# Patient Record
Sex: FEMALE | Race: WHITE | NOT HISPANIC OR LATINO | Employment: OTHER | URBAN - NONMETROPOLITAN AREA
[De-identification: names, ages, dates, MRNs, and addresses within clinical notes are randomized per-mention and may not be internally consistent; named-entity substitution may affect disease eponyms.]

---

## 2022-07-12 ENCOUNTER — OFFICE VISIT (OUTPATIENT)
Dept: CARDIOLOGY | Facility: CLINIC | Age: 87
End: 2022-07-12

## 2022-07-12 VITALS
HEIGHT: 64 IN | HEART RATE: 106 BPM | SYSTOLIC BLOOD PRESSURE: 172 MMHG | OXYGEN SATURATION: 92 % | BODY MASS INDEX: 21.34 KG/M2 | WEIGHT: 125 LBS | DIASTOLIC BLOOD PRESSURE: 88 MMHG

## 2022-07-12 DIAGNOSIS — R55 SYNCOPE AND COLLAPSE: ICD-10-CM

## 2022-07-12 DIAGNOSIS — R42 DIZZINESS: Primary | ICD-10-CM

## 2022-07-12 DIAGNOSIS — R00.2 PALPITATIONS: ICD-10-CM

## 2022-07-12 PROCEDURE — 99204 OFFICE O/P NEW MOD 45 MIN: CPT | Performed by: PHYSICIAN ASSISTANT

## 2022-07-12 RX ORDER — MECLIZINE HYDROCHLORIDE 25 MG/1
25 TABLET ORAL 3 TIMES DAILY PRN
COMMUNITY

## 2022-07-12 RX ORDER — ERGOCALCIFEROL 1.25 MG/1
50000 CAPSULE ORAL WEEKLY
COMMUNITY

## 2022-07-12 RX ORDER — TRAZODONE HYDROCHLORIDE 50 MG/1
50 TABLET ORAL AS NEEDED
COMMUNITY

## 2022-07-12 RX ORDER — CLONIDINE HYDROCHLORIDE 0.1 MG/1
TABLET ORAL
Qty: 30 TABLET | Refills: 5 | Status: SHIPPED | OUTPATIENT
Start: 2022-07-12 | End: 2022-10-06 | Stop reason: SDUPTHER

## 2022-07-12 RX ORDER — CITALOPRAM 10 MG/1
10 TABLET ORAL DAILY
COMMUNITY

## 2022-07-12 RX ORDER — VALSARTAN 320 MG/1
320 TABLET ORAL DAILY
Qty: 30 TABLET | Refills: 11 | Status: SHIPPED | OUTPATIENT
Start: 2022-07-12

## 2022-07-12 RX ORDER — AMLODIPINE AND VALSARTAN 5; 160 MG/1; MG/1
1 TABLET ORAL DAILY
COMMUNITY
End: 2022-07-12

## 2022-07-12 RX ORDER — ASPIRIN 81 MG/1
81 TABLET ORAL DAILY
COMMUNITY

## 2022-07-12 RX ORDER — FERROUS SULFATE TAB EC 324 MG (65 MG FE EQUIVALENT) 324 (65 FE) MG
324 TABLET DELAYED RESPONSE ORAL AS NEEDED
COMMUNITY

## 2022-07-12 RX ORDER — LANOLIN ALCOHOL/MO/W.PET/CERES
1000 CREAM (GRAM) TOPICAL DAILY
COMMUNITY

## 2022-07-12 NOTE — PROGRESS NOTES
Subjective   NOEL Rice is a 88 y.o. female     Chief Complaint   Patient presents with   • Hypertension   • Hospital follow up [facility]  The patient presents to clinic today to establish cardiovascular care, referred because of recent ER evaluation for hypertension but severe orthostasis.  The patient was recently evaluated through the local emergency room because of onset of presyncope, even possibly a very brief syncopal episode by patient and family's report.  This occurred with position change.  She was seen through the emergency room where laboratories, CT of the head, and EKG all were largely benign.  By report, chest x-ray suggested a small right perihilar opacity possibly representing atelectasis, however early pneumonia could not be completely excluded.  By her report, she follows with her primary care provider with the same.  Because of ongoing hypertensive values, she was given IV beta-blocker, labetalol, and she eventually was noted to be normotensive.  Orthostatic changes, again by report, was reproduced by ER provider evaluation.  She was encouraged for orthostatic precautions, and discharged for outpatient follow-up.  She presents today in that setting.  Clinically, the patient denies chest pain.  She has stable dyspnea.  She reports intermittent palpitations but no sustained dysrhythmic activity.  She still has recurrent episodes of dizziness, still typically with position change.  Despite this she remains very hypertensive even when checked at home.  She has no further complaints otherwise at this time.      Current Outpatient Medications   Medication Sig Dispense Refill   • aspirin 81 MG EC tablet Take 81 mg by mouth Daily.     • citalopram (CeleXA) 10 MG tablet Take 10 mg by mouth Daily.     • ferrous sulfate 324 (65 Fe) MG tablet delayed-release EC tablet Take 324 mg by mouth Every Other Day.     • meclizine (ANTIVERT) 25 MG tablet Take 25 mg by mouth 3 (Three) Times a Day As Needed for  Dizziness.     • metFORMIN (GLUCOPHAGE) 500 MG tablet Take 500 mg by mouth 2 (Two) Times a Day With Meals.     • traZODone (DESYREL) 50 MG tablet Take 50 mg by mouth Every Night.     • vitamin B-12 (CYANOCOBALAMIN) 1000 MCG tablet Take 1,000 mcg by mouth Daily.     • vitamin D (ERGOCALCIFEROL) 1.25 MG (27181 UT) capsule capsule Take 50,000 Units by mouth 1 (One) Time Per Week.     • cloNIDine (Catapres) 0.1 MG tablet Take TID PRN for BP higher than top #160, bottom # 90 30 tablet 5   • valsartan (DIOVAN) 320 MG tablet Take 1 tablet by mouth Daily. 30 tablet 11     No current facility-administered medications for this visit.       Patient has no known allergies.    Past Medical History:   Diagnosis Date   • Anxiety    • Diabetes mellitus (HCC)    • Hypertension    • Vitamin D deficiency        Social History     Socioeconomic History   • Marital status:    Tobacco Use   • Smoking status: Former Smoker     Years: 5.00   Substance and Sexual Activity   • Alcohol use: Not Currently   • Drug use: Never   • Sexual activity: Defer       Family History   Problem Relation Age of Onset   • Diabetes Mother    • Heart attack Father    • Heart attack Sister    • Lung cancer Sister        Review of Systems   Constitutional: Negative.  Negative for chills and fever.   HENT: Negative.  Negative for congestion and sinus pressure.    Respiratory: Positive for shortness of breath. Negative for chest tightness.    Cardiovascular: Positive for palpitations (races at times). Negative for chest pain and leg swelling.   Gastrointestinal: Negative.  Negative for abdominal pain, blood in stool, constipation, diarrhea, nausea and vomiting.   Genitourinary: Negative.  Negative for dysuria, frequency, hematuria and urgency.   Neurological: Positive for dizziness (taking meclizine prn). Negative for syncope and light-headedness.   Hematological: Bruises/bleeds easily.   Psychiatric/Behavioral: Negative.  Negative for sleep disturbance  "(denies waking with soa or cp).       Objective     Vitals:    07/12/22 1458   BP: 172/88   BP Location: Left arm   Patient Position: Sitting   Pulse: 106   SpO2: 92%   Weight: 56.7 kg (125 lb)   Height: 162.6 cm (64\")        /88 (BP Location: Left arm, Patient Position: Sitting)   Pulse 106   Ht 162.6 cm (64\")   Wt 56.7 kg (125 lb)   SpO2 92%   BMI 21.46 kg/m²      Lab Results (most recent)     None          Physical Exam  Vitals and nursing note reviewed.   Constitutional:       General: She is not in acute distress.     Appearance: She is well-developed.   HENT:      Head: Normocephalic and atraumatic.   Eyes:      Conjunctiva/sclera: Conjunctivae normal.      Pupils: Pupils are equal, round, and reactive to light.   Neck:      Vascular: No JVD.      Trachea: No tracheal deviation.   Cardiovascular:      Rate and Rhythm: Normal rate and regular rhythm.      Heart sounds: Normal heart sounds.      Comments: 1/6 systolic ejection murmur noted.  Pulmonary:      Effort: Pulmonary effort is normal.      Breath sounds: Normal breath sounds.   Abdominal:      General: Bowel sounds are normal. There is no distension.      Palpations: Abdomen is soft. There is no mass.      Tenderness: There is no abdominal tenderness. There is no guarding or rebound.   Musculoskeletal:         General: No tenderness or deformity. Normal range of motion.      Cervical back: Normal range of motion and neck supple.   Skin:     General: Skin is warm and dry.      Coloration: Skin is not pale.      Findings: No erythema or rash.   Neurological:      Mental Status: She is alert and oriented to person, place, and time.   Psychiatric:         Behavior: Behavior normal.         Thought Content: Thought content normal.         Judgment: Judgment normal.         Procedure   Procedures         Assessment & Plan      Diagnosis Plan   1. Dizziness  Basic Metabolic Panel    Adult Transthoracic Echo Complete W/ Cont if Necessary Per Protocol "    Holter Monitor - 24 Hour   2. Syncope and collapse  Basic Metabolic Panel    Adult Transthoracic Echo Complete W/ Cont if Necessary Per Protocol    Holter Monitor - 24 Hour   3. Palpitations  Basic Metabolic Panel    Adult Transthoracic Echo Complete W/ Cont if Necessary Per Protocol    Holter Monitor - 24 Hour     1.  The patient has recurrent episodes of dizziness, mostly consistent with orthostasis.  She had however a presyncopal episode with a possibly very brief episode of syncope prior to ER evaluation.  Laboratories, EKG, and telemetry were largely unremarkable.  By report, orthostasis was reproduced with blood pressure checks at that time.  She was given IV labetalol and blood pressures eventually normalized and she was discharged in stable condition for outpatient follow-up.    2.  In that setting, cardiac consultation has been requested.  I would discontinue amlodipine because of her degree of orthostasis.  As she has ongoing hypertension, I would increase valsartan to 320 mg 1 tab daily.    3.  Given increase in the same, we will schedule BMP in 2 weeks to evaluate renal function and potassium.    4.  We did give her clonidine 0.1 mg as needed blood pressures greater than 160/90.  We will await response to adjustment in valsartan and can adjust antihypertensives further in the near future if needed.  She has clonidine to take if further issues are noted with hypertensive excursions.    5.  I would schedule for a Holter monitor to evaluate for rate or rhythm disturbance issues potentially contributing to clinical scenario as above.    6.  We will also schedule for an echocardiogram to evaluate her structurally, again given symptoms.    7.  She will monitor blood pressures and clinical course otherwise and call to us immediately for any issues.  We can recommend her further once we know results of studies and response to change in medications.           NOEL Rice  reports that she has quit smoking.  She quit after 5.00 years of use. She does not have any smokeless tobacco history on file..     Patient brought in medicine list to appointment, it's been reviewed with patient and med list was updated in the chart.     Advance Care Planning   ACP discussion was held with the patient during this visit. Patient has an advance directive (not in EMR), copy requested.           BMI is within normal parameters. No other follow-up for BMI required.           Electronically signed by:

## 2022-07-13 ENCOUNTER — LAB (OUTPATIENT)
Dept: LAB | Facility: HOSPITAL | Age: 87
End: 2022-07-13

## 2022-07-13 DIAGNOSIS — R00.2 PALPITATIONS: ICD-10-CM

## 2022-07-13 DIAGNOSIS — R42 DIZZINESS: ICD-10-CM

## 2022-07-13 DIAGNOSIS — R55 SYNCOPE AND COLLAPSE: ICD-10-CM

## 2022-07-13 LAB
ANION GAP SERPL CALCULATED.3IONS-SCNC: 16.6 MMOL/L (ref 5–15)
BUN SERPL-MCNC: 20 MG/DL (ref 8–23)
BUN/CREAT SERPL: 40 (ref 7–25)
CALCIUM SPEC-SCNC: 10.3 MG/DL (ref 8.6–10.5)
CHLORIDE SERPL-SCNC: 97 MMOL/L (ref 98–107)
CO2 SERPL-SCNC: 22.4 MMOL/L (ref 22–29)
CREAT SERPL-MCNC: 0.5 MG/DL (ref 0.57–1)
EGFRCR SERPLBLD CKD-EPI 2021: 90.3 ML/MIN/1.73
GLUCOSE SERPL-MCNC: 111 MG/DL (ref 65–99)
POTASSIUM SERPL-SCNC: 4.9 MMOL/L (ref 3.5–5.2)
SODIUM SERPL-SCNC: 136 MMOL/L (ref 136–145)

## 2022-07-13 PROCEDURE — 80048 BASIC METABOLIC PNL TOTAL CA: CPT

## 2022-07-13 PROCEDURE — 36415 COLL VENOUS BLD VENIPUNCTURE: CPT

## 2022-08-15 ENCOUNTER — HOSPITAL ENCOUNTER (OUTPATIENT)
Dept: CARDIOLOGY | Facility: HOSPITAL | Age: 87
Discharge: HOME | End: 2022-08-15
Admitting: PHYSICIAN ASSISTANT

## 2022-08-15 DIAGNOSIS — R55 SYNCOPE AND COLLAPSE: ICD-10-CM

## 2022-08-15 DIAGNOSIS — R00.2 PALPITATIONS: ICD-10-CM

## 2022-08-15 DIAGNOSIS — R42 DIZZINESS: ICD-10-CM

## 2022-08-15 PROCEDURE — 93306 TTE W/DOPPLER COMPLETE: CPT | Performed by: INTERNAL MEDICINE

## 2022-08-15 PROCEDURE — 93306 TTE W/DOPPLER COMPLETE: CPT

## 2022-08-24 LAB
BH CV ECHO MEAS - ACS: 1.77 CM
BH CV ECHO MEAS - AO MAX PG: 7.5 MMHG
BH CV ECHO MEAS - AO MEAN PG: 3.1 MMHG
BH CV ECHO MEAS - AO ROOT DIAM: 3.2 CM
BH CV ECHO MEAS - AO V2 MAX: 135.6 CM/SEC
BH CV ECHO MEAS - AO V2 VTI: 27.4 CM
BH CV ECHO MEAS - AVA(I,D): 2.04 CM2
BH CV ECHO MEAS - EDV(CUBED): 31.6 ML
BH CV ECHO MEAS - EDV(MOD-SP4): 44.8 ML
BH CV ECHO MEAS - EF(MOD-BP): 68 %
BH CV ECHO MEAS - EF(MOD-SP4): 65.4 %
BH CV ECHO MEAS - ESV(CUBED): 15.8 ML
BH CV ECHO MEAS - ESV(MOD-SP4): 15.5 ML
BH CV ECHO MEAS - FS: 20.6 %
BH CV ECHO MEAS - IVS/LVPW: 1.1 CM
BH CV ECHO MEAS - IVSD: 1.37 CM
BH CV ECHO MEAS - LA DIMENSION: 3.5 CM
BH CV ECHO MEAS - LAT PEAK E' VEL: 6.4 CM/SEC
BH CV ECHO MEAS - LV DIASTOLIC VOL/BSA (35-75): 28 CM2
BH CV ECHO MEAS - LV MASS(C)D: 135.1 GRAMS
BH CV ECHO MEAS - LV MAX PG: 3 MMHG
BH CV ECHO MEAS - LV MEAN PG: 2.01 MMHG
BH CV ECHO MEAS - LV SYSTOLIC VOL/BSA (12-30): 9.7 CM2
BH CV ECHO MEAS - LV V1 MAX: 75.4 CM/SEC
BH CV ECHO MEAS - LV V1 VTI: 19.7 CM
BH CV ECHO MEAS - LVIDD: 3.2 CM
BH CV ECHO MEAS - LVIDS: 2.5 CM
BH CV ECHO MEAS - LVOT AREA: 2.8 CM2
BH CV ECHO MEAS - LVOT DIAM: 1.9 CM
BH CV ECHO MEAS - LVPWD: 1.25 CM
BH CV ECHO MEAS - MED PEAK E' VEL: 4.7 CM/SEC
BH CV ECHO MEAS - MR MAX PG: 160.2 MMHG
BH CV ECHO MEAS - MR MAX VEL: 632.2 CM/SEC
BH CV ECHO MEAS - MR MEAN PG: 85.3 MMHG
BH CV ECHO MEAS - MR MEAN VEL: 412.3 CM/SEC
BH CV ECHO MEAS - MR VTI: 210.1 CM
BH CV ECHO MEAS - MV A MAX VEL: 146.3 CM/SEC
BH CV ECHO MEAS - MV DEC SLOPE: 399 CM/SEC2
BH CV ECHO MEAS - MV E MAX VEL: 84.5 CM/SEC
BH CV ECHO MEAS - MV E/A: 0.58
BH CV ECHO MEAS - MV MAX PG: 7.7 MMHG
BH CV ECHO MEAS - MV MEAN PG: 3.4 MMHG
BH CV ECHO MEAS - MV P1/2T: 60.8 MSEC
BH CV ECHO MEAS - MV V2 VTI: 27.9 CM
BH CV ECHO MEAS - MVA(P1/2T): 3.6 CM2
BH CV ECHO MEAS - MVA(VTI): 2 CM2
BH CV ECHO MEAS - PA V2 MAX: 83.1 CM/SEC
BH CV ECHO MEAS - RAP SYSTOLE: 10 MMHG
BH CV ECHO MEAS - RV MAX PG: 1.95 MMHG
BH CV ECHO MEAS - RV V1 MAX: 69.8 CM/SEC
BH CV ECHO MEAS - RV V1 VTI: 11.4 CM
BH CV ECHO MEAS - RVDD: 2.26 CM
BH CV ECHO MEAS - RVSP: 67.4 MMHG
BH CV ECHO MEAS - SI(MOD-SP4): 18.3 ML/M2
BH CV ECHO MEAS - SV(LVOT): 55.7 ML
BH CV ECHO MEAS - SV(MOD-SP4): 29.3 ML
BH CV ECHO MEAS - TAPSE (>1.6): 1.48 CM
BH CV ECHO MEAS - TR MAX PG: 57.4 MMHG
BH CV ECHO MEAS - TR MAX VEL: 378.7 CM/SEC
BH CV ECHO MEASUREMENTS AVERAGE E/E' RATIO: 15.23
IVRT: 119 MSEC
LEFT ATRIUM VOLUME INDEX: 23.4 ML/M2
MAXIMAL PREDICTED HEART RATE: 132 BPM
STRESS TARGET HR: 112 BPM

## 2022-08-31 ENCOUNTER — TELEPHONE (OUTPATIENT)
Dept: CARDIOLOGY | Facility: CLINIC | Age: 87
End: 2022-08-31

## 2022-08-31 DIAGNOSIS — I27.20 PULMONARY HYPERTENSION: Primary | ICD-10-CM

## 2022-08-31 DIAGNOSIS — R00.2 PALPITATIONS: ICD-10-CM

## 2022-08-31 DIAGNOSIS — R06.02 SHORTNESS OF BREATH: ICD-10-CM

## 2022-08-31 DIAGNOSIS — R68.89 HYPOKINESIS: ICD-10-CM

## 2022-09-07 ENCOUNTER — TELEPHONE (OUTPATIENT)
Dept: CARDIOLOGY | Facility: CLINIC | Age: 87
End: 2022-09-07

## 2022-09-07 NOTE — TELEPHONE ENCOUNTER
"Pt's daughter called stating that pt received a call to \"change her stress test.\" I tried to reach Dora, but she was unavailable. I called Tere, she stated that everything looks fine on their end and that stress is set up for 9/14/22.    I informed pt's daughter of the above and apologized for any confusion.   "

## 2022-09-14 ENCOUNTER — HOSPITAL ENCOUNTER (OUTPATIENT)
Dept: CARDIOLOGY | Facility: HOSPITAL | Age: 87
Discharge: HOME | End: 2022-09-14

## 2022-09-14 DIAGNOSIS — R06.02 SHORTNESS OF BREATH: ICD-10-CM

## 2022-09-14 DIAGNOSIS — I27.20 PULMONARY HYPERTENSION: ICD-10-CM

## 2022-09-14 DIAGNOSIS — R68.89 HYPOKINESIS: ICD-10-CM

## 2022-09-14 DIAGNOSIS — R00.2 PALPITATIONS: ICD-10-CM

## 2022-09-14 PROCEDURE — 78452 HT MUSCLE IMAGE SPECT MULT: CPT | Performed by: INTERNAL MEDICINE

## 2022-09-14 PROCEDURE — 0 TECHNETIUM SESTAMIBI: Performed by: INTERNAL MEDICINE

## 2022-09-14 PROCEDURE — 78452 HT MUSCLE IMAGE SPECT MULT: CPT

## 2022-09-14 PROCEDURE — 25010000002 REGADENOSON 0.4 MG/5ML SOLUTION: Performed by: INTERNAL MEDICINE

## 2022-09-14 PROCEDURE — A9500 TC99M SESTAMIBI: HCPCS | Performed by: INTERNAL MEDICINE

## 2022-09-14 PROCEDURE — 93017 CV STRESS TEST TRACING ONLY: CPT

## 2022-09-14 PROCEDURE — 93018 CV STRESS TEST I&R ONLY: CPT | Performed by: INTERNAL MEDICINE

## 2022-09-14 RX ADMIN — TECHNETIUM TC 99M SESTAMIBI 1 DOSE: 1 INJECTION INTRAVENOUS at 13:51

## 2022-09-14 RX ADMIN — REGADENOSON 0.4 MG: 0.08 INJECTION, SOLUTION INTRAVENOUS at 13:51

## 2022-09-14 RX ADMIN — TECHNETIUM TC 99M SESTAMIBI 1 DOSE: 1 INJECTION INTRAVENOUS at 12:19

## 2022-09-21 LAB
BH CV REST NUCLEAR ISOTOPE DOSE: 10 MCI
BH CV STRESS COMMENTS STAGE 1: NORMAL
BH CV STRESS DOSE REGADENOSON STAGE 1: 0.4
BH CV STRESS DURATION MIN STAGE 1: 0
BH CV STRESS DURATION SEC STAGE 1: 10
BH CV STRESS NUCLEAR ISOTOPE DOSE: 30 MCI
BH CV STRESS PROTOCOL 1: NORMAL
BH CV STRESS RECOVERY BP: NORMAL MMHG
BH CV STRESS RECOVERY HR: 104 BPM
BH CV STRESS STAGE 1: 1
MAXIMAL PREDICTED HEART RATE: 132 BPM
PERCENT MAX PREDICTED HR: 81.06 %
STRESS BASELINE BP: NORMAL MMHG
STRESS BASELINE HR: 90 BPM
STRESS PERCENT HR: 95 %
STRESS POST PEAK BP: NORMAL MMHG
STRESS POST PEAK HR: 107 BPM
STRESS TARGET HR: 112 BPM

## 2022-09-26 ENCOUNTER — TELEPHONE (OUTPATIENT)
Dept: CARDIOLOGY | Facility: CLINIC | Age: 87
End: 2022-09-26

## 2022-09-26 NOTE — TELEPHONE ENCOUNTER
Follow up scheduled 10/3/22. Patient notified by front office. .  Tere Dumont MA      ----- Message from ABIMAEL Guillermo sent at 9/23/2022  1:05 PM EDT -----    ----- Message -----  From: Chidi Perdue PA  Sent: 9/23/2022   9:01 AM EDT  To: Marianna Ritter MA    Follow-up 3 to 4 weeks.

## 2022-10-03 ENCOUNTER — OFFICE VISIT (OUTPATIENT)
Dept: CARDIOLOGY | Facility: CLINIC | Age: 87
End: 2022-10-03

## 2022-10-03 VITALS
WEIGHT: 125.2 LBS | BODY MASS INDEX: 21.37 KG/M2 | OXYGEN SATURATION: 97 % | SYSTOLIC BLOOD PRESSURE: 153 MMHG | DIASTOLIC BLOOD PRESSURE: 79 MMHG | HEART RATE: 107 BPM | HEIGHT: 64 IN

## 2022-10-03 DIAGNOSIS — R06.02 SHORTNESS OF BREATH: ICD-10-CM

## 2022-10-03 DIAGNOSIS — R42 DIZZINESS: ICD-10-CM

## 2022-10-03 DIAGNOSIS — R00.2 PALPITATIONS: ICD-10-CM

## 2022-10-03 DIAGNOSIS — I27.20 PULMONARY HYPERTENSION: Primary | ICD-10-CM

## 2022-10-03 PROCEDURE — 99214 OFFICE O/P EST MOD 30 MIN: CPT | Performed by: PHYSICIAN ASSISTANT

## 2022-10-03 NOTE — PROGRESS NOTES
Problem list     Subjective   NOEL Rice is a 88 y.o. female     Chief Complaint   Patient presents with   • Shortness of Breath     Abn stress, echo and monitor f/u   • Dizziness   • Hypertension       HPI  The patient presents into the clinic today to review test findings.  She was scheduled for testing because of recurrent dizziness with even a syncopal episode, dyspnea, palpitations, and issues otherwise.  She initially was scheduled for an echo and a Holter monitor.  Echo supported low normal EF at 50 to 55% with possible inferior wall hypokinesis.  This was felt to be a new finding for the patient.  She did have moderate mitral regurgitation, but no significant abnormalities otherwise in that regard.  She also had what was felt to be moderate to severe pulmonary hypertension with PA systolic pressures in the high 60s.  Holter monitor indicated sinus rhythm throughout with PACs and PVCs which were occasional, short-lived runs of SVT, but no significant dysrhythmic activity.  She had nothing certainly to describe syncope as above.  Because of low normal EF with inferior hypokinesis, we did recommend stress testing.  She had that and presents today to discuss all these findings.  Stress test supported inferoseptal, inferior, and inferolateral wall MI with no significant amy-infarct ischemia.  Post stress EF was at 50%.    The patient continues to have dizziness.  This was previously felt to be compatible with orthostasis.  The patient otherwise reports only rare chest tightness, but ongoing chest pain otherwise.  She has ongoing dyspnea which is limiting.  She still has dizziness as above.  She has had no further presyncope and certainly no syncopal episodes.  She denies failure symptoms.  Blood pressures fluctuate but are high normal mostly when checked.  She would like to follow that closely at home before we adjust antihypertensive therapies.  Patient has no further complaints otherwise at this  time.    Current Outpatient Medications on File Prior to Visit   Medication Sig Dispense Refill   • aspirin 81 MG EC tablet Take 81 mg by mouth Daily.     • citalopram (CeleXA) 10 MG tablet Take 10 mg by mouth Daily.     • cloNIDine (Catapres) 0.1 MG tablet Take TID PRN for BP higher than top #160, bottom # 90 30 tablet 5   • ferrous sulfate 324 (65 Fe) MG tablet delayed-release EC tablet Take 324 mg by mouth As Needed.     • meclizine (ANTIVERT) 25 MG tablet Take 25 mg by mouth 3 (Three) Times a Day As Needed for Dizziness.     • metFORMIN (GLUCOPHAGE) 1000 MG tablet Take 1,000 mg by mouth 2 (Two) Times a Day With Meals.     • traZODone (DESYREL) 50 MG tablet Take 50 mg by mouth As Needed.     • valsartan (DIOVAN) 320 MG tablet Take 1 tablet by mouth Daily. 30 tablet 11   • vitamin B-12 (CYANOCOBALAMIN) 1000 MCG tablet Take 1,000 mcg by mouth Daily.     • vitamin D (ERGOCALCIFEROL) 1.25 MG (30846 UT) capsule capsule Take 50,000 Units by mouth 1 (One) Time Per Week.       No current facility-administered medications on file prior to visit.       Patient has no known allergies.    Past Medical History:   Diagnosis Date   • Anxiety    • Diabetes mellitus (HCC)    • Hypertension    • Vitamin D deficiency        Social History     Socioeconomic History   • Marital status:    Tobacco Use   • Smoking status: Former Smoker     Years: 5.00   • Smokeless tobacco: Never Used   Substance and Sexual Activity   • Alcohol use: Not Currently   • Drug use: Never   • Sexual activity: Defer       Family History   Problem Relation Age of Onset   • Diabetes Mother    • Heart attack Father    • Heart attack Sister    • Lung cancer Sister        Review of Systems   Constitutional: Positive for fatigue. Negative for chills, diaphoresis and fever.   HENT: Positive for tinnitus.    Eyes: Negative.    Respiratory: Positive for cough and shortness of breath. Negative for apnea, chest tightness and wheezing.    Cardiovascular: Positive  "for palpitations (racing). Negative for chest pain and leg swelling.   Gastrointestinal: Negative.  Negative for abdominal pain, blood in stool, constipation, diarrhea, nausea and vomiting.   Endocrine: Negative.    Genitourinary: Negative.  Negative for hematuria.   Musculoskeletal: Positive for arthralgias. Negative for back pain, myalgias and neck pain.   Skin: Negative.    Allergic/Immunologic: Negative.    Neurological: Positive for dizziness (with movement), weakness and headaches. Negative for syncope, light-headedness and numbness.   Hematological: Bruises/bleeds easily.   Psychiatric/Behavioral: Negative for sleep disturbance.       Objective   Vitals:    10/03/22 1310   BP: 153/79   BP Location: Left arm   Patient Position: Sitting   Pulse: 107   SpO2: 97%   Weight: 56.8 kg (125 lb 3.2 oz)   Height: 162.6 cm (64.02\")      /79 (BP Location: Left arm, Patient Position: Sitting)   Pulse 107   Ht 162.6 cm (64.02\")   Wt 56.8 kg (125 lb 3.2 oz)   SpO2 97%   BMI 21.48 kg/m²    Lab Results (most recent)     None        Physical Exam  Vitals and nursing note reviewed.   Constitutional:       General: She is not in acute distress.     Appearance: She is well-developed.   HENT:      Head: Normocephalic and atraumatic.   Eyes:      Conjunctiva/sclera: Conjunctivae normal.      Pupils: Pupils are equal, round, and reactive to light.   Neck:      Vascular: No JVD.      Trachea: No tracheal deviation.   Cardiovascular:      Rate and Rhythm: Normal rate and regular rhythm.      Heart sounds: Murmur (LSB) heard.    Systolic murmur is present with a grade of 1/6.  Pulmonary:      Effort: Pulmonary effort is normal.      Breath sounds: Normal breath sounds.   Abdominal:      General: Bowel sounds are normal. There is no distension.      Palpations: Abdomen is soft. There is no mass.      Tenderness: There is no abdominal tenderness. There is no guarding or rebound.   Musculoskeletal:         General: No tenderness " or deformity. Normal range of motion.      Cervical back: Normal range of motion and neck supple.   Skin:     General: Skin is warm and dry.      Coloration: Skin is not pale.      Findings: No erythema or rash.   Neurological:      Mental Status: She is alert and oriented to person, place, and time.   Psychiatric:         Behavior: Behavior normal.         Thought Content: Thought content normal.         Judgment: Judgment normal.           Procedure   Procedures       Assessment & Plan      Diagnosis Plan   1. Pulmonary hypertension (HCC)     2. Shortness of breath     3. Palpitations     4. Dizziness       1.  I am concerned with the patient's stress and echo findings.  Stress test supports a basically inferior and inferolateral wall MI, which is new to the patient by her report.  She has been told of no MI in the past.  Echo supports still inferior wall MI with low normal systolic function.  With clinical symptoms and scenario otherwise, particularly when coupled with stress and echo findings, I have recommended catheterization.  She wants to think about this and discuss this with family members.  She would call back if she decides to have that done.    2.  Because of pulmonary hypertension, with PA systolic pressures in the mid to high 60s, I did recommend pulmonary evaluation.  I have recommended that the patient have ongoing evaluation for the same.  She will call if she has any complications with that appointment.    3.  For now, I would continue medical regimen.  She does feel that she is high normal to even hypertensive a lot when checked at medical offices.  She will start monitoring that closely at home.  I would hold from further antihypertensive therapy for now as she has had severe orthostasis in the past.  She still has recurrent orthostatic changes at home, which is fairly symptomatic.  She feels that this has not been intensified by the increase in valsartan therapy.  She will monitor that we can  adjust if able.    4.  Further pending the patient's decision.  If catheterization was considered, I would consider right heart catheterization at the same time.    5.  We will see her routinely through the clinic and when she calls back with her decision.           NOEL Rice  reports that she has quit smoking. She quit after 5.00 years of use. She has never used smokeless tobacco..     Advance Care Planning   ACP discussion was held with the patient during this visit. Patient has an advance directive (not in EMR), copy requested.         Electronically signed by:

## 2022-10-06 ENCOUNTER — OFFICE VISIT (OUTPATIENT)
Dept: PULMONOLOGY | Facility: CLINIC | Age: 87
End: 2022-10-06

## 2022-10-06 VITALS
WEIGHT: 126 LBS | DIASTOLIC BLOOD PRESSURE: 96 MMHG | BODY MASS INDEX: 21.51 KG/M2 | HEART RATE: 102 BPM | OXYGEN SATURATION: 95 % | HEIGHT: 64 IN | SYSTOLIC BLOOD PRESSURE: 171 MMHG

## 2022-10-06 DIAGNOSIS — R06.02 SHORTNESS OF BREATH: Primary | ICD-10-CM

## 2022-10-06 DIAGNOSIS — R09.82 POST-NASAL DRIP: ICD-10-CM

## 2022-10-06 DIAGNOSIS — I27.20 PULMONARY HYPERTENSION: ICD-10-CM

## 2022-10-06 DIAGNOSIS — R93.89 ABNORMAL CXR: ICD-10-CM

## 2022-10-06 DIAGNOSIS — K21.00 GASTROESOPHAGEAL REFLUX DISEASE WITH ESOPHAGITIS, UNSPECIFIED WHETHER HEMORRHAGE: ICD-10-CM

## 2022-10-06 DIAGNOSIS — J43.9 PULMONARY EMPHYSEMA, UNSPECIFIED EMPHYSEMA TYPE: ICD-10-CM

## 2022-10-06 PROCEDURE — 99204 OFFICE O/P NEW MOD 45 MIN: CPT | Performed by: NURSE PRACTITIONER

## 2022-10-06 RX ORDER — CLONIDINE HYDROCHLORIDE 0.1 MG/1
0.1 TABLET ORAL 2 TIMES DAILY
Qty: 60 TABLET | Refills: 11 | Status: SHIPPED | OUTPATIENT
Start: 2022-10-06

## 2022-10-06 RX ORDER — ALBUTEROL SULFATE 90 UG/1
2 AEROSOL, METERED RESPIRATORY (INHALATION) EVERY 4 HOURS PRN
Qty: 6.7 G | Refills: 1 | Status: SHIPPED | OUTPATIENT
Start: 2022-10-06 | End: 2022-12-28

## 2022-10-06 RX ORDER — MOMETASONE FUROATE 50 UG/1
2 SPRAY, METERED NASAL DAILY
Qty: 17 G | Refills: 5 | Status: SHIPPED | OUTPATIENT
Start: 2022-10-06

## 2022-10-06 NOTE — PROGRESS NOTES
"     New Pulmonary Patient Office Visit      Patient Name: NOEL Rice    Referring Physician: Chidi Perdue PA    Chief Complaint:    Chief Complaint   Patient presents with   • Consult   • Shortness of Breath       History of Present Illness: NOEL Rice is a 88 y.o. female who is here today to establish care with Pulmonary for shortness of breath.    The patient recently saw NATE Trujillo on Monday, 10/03/2022, who was concerned about her elevated pulmonary artery pressures revealed on her echocardiogram. She denies any recent angina and has not been previously diagnosed with any underlying lung disease including COPD, asthma, or emphysema.     The patient states she has been experiencing moderate dyspnea with minimal exertion. She adds that she becomes fatigued with activities of daily living including showering and getting dressed. She notes she has been experiencing these symptoms over the past year. The patient reports constant coughing, but she denies any wheezing or ankle swelling. She also denies any history of frequent exposure to secondhand smoke or working in an environment in the past where she was exposed to airborne pollutants. The patient reports obtaining a chest x-ray in 06/2022, at [facility] when her blood pressure was elevated which suggested underlying emphysema and a right-sided opacity.     The patient states her blood pressure was not uncontrolled until recently. She reports her blood pressure medication was adjusted, but the adult female with her notes the patient was seen at the emergency room in 06/2022, previous to the medication change, secondary to concern regarding her blood pressure being elevated at approximately \"235/1-something\" mmHg. However, the adult female adds that the patient's blood pressure did decrease that night. The patient is taking clonidine and valsartan 320 mg. She states she has been on valsartan for approximately 1.5 months or longer. She is not " "open to considering any cardiac catheterization at this time.      Additionally, the patient adds that she has noticed gastric reflux over the past 2 to 3 nights secondary to foods she has eaten, and she takes Tums for this.     Regarding her sleep, the patient states she likes to stay up late at night and sleep in late in the mornings, but intermittently she does not like to sleep at all. The adult female does not believe the patient snores. The patient denies breathing difficulty at night. She notes feeling \"groggy\" upon waking in the morning and she sits for 5 to 10 minutes prior to getting up secondary to dizziness. She declines home sleep study testing. The adult female believes meclizine increases the patient's fatigue. The patient is not overweight.     She confirms having seasonal allergies. She reports taking Claritin in the past without relief as well as Flonase nasal spray which caused her dizziness, according to the adult female.     The patient confirms a history of diabetes mellitus type 2 and she is only taking metformin currently.     She reports falling 3 weeks ago, which resulted in the development of ecchymosis on her knee.     The patient reports a past history of smoking for approximately 5 years, but denies a history of asthma.     Subjective      Review of Systems:   Review of Systems   Constitutional: Negative for fever and unexpected weight change.   HENT: Positive for postnasal drip.    Respiratory: Positive for cough and shortness of breath. Negative for wheezing.    Cardiovascular: Negative for chest pain and leg swelling.   Allergic/Immunologic: Positive for environmental allergies.      Past Medical History:   Past Medical History:   Diagnosis Date   • Anxiety    • Diabetes mellitus (HCC)    • Hypertension    • Vitamin D deficiency        Past Surgical History:   Past Surgical History:   Procedure Laterality Date   • CHOLECYSTECTOMY     • HYSTERECTOMY         Family History:   Family " "History   Problem Relation Age of Onset   • Diabetes Mother    • Heart attack Father    • Heart attack Sister    • Lung cancer Sister        Social History:   Social History     Socioeconomic History   • Marital status:    Tobacco Use   • Smoking status: Former     Years: 5.00     Types: Cigarettes   • Smokeless tobacco: Never   Substance and Sexual Activity   • Alcohol use: Not Currently   • Drug use: Never   • Sexual activity: Defer       Medications:     Current Outpatient Medications:   •  aspirin 81 MG EC tablet, Take 81 mg by mouth Daily., Disp: , Rfl:   •  citalopram (CeleXA) 10 MG tablet, Take 10 mg by mouth Daily., Disp: , Rfl:   •  cloNIDine (Catapres) 0.1 MG tablet, Take TID PRN for BP higher than top #160, bottom # 90, Disp: 30 tablet, Rfl: 5  •  ferrous sulfate 324 (65 Fe) MG tablet delayed-release EC tablet, Take 324 mg by mouth As Needed., Disp: , Rfl:   •  meclizine (ANTIVERT) 25 MG tablet, Take 25 mg by mouth 3 (Three) Times a Day As Needed for Dizziness., Disp: , Rfl:   •  metFORMIN (GLUCOPHAGE) 1000 MG tablet, Take 1,000 mg by mouth 2 (Two) Times a Day With Meals., Disp: , Rfl:   •  traZODone (DESYREL) 50 MG tablet, Take 50 mg by mouth As Needed., Disp: , Rfl:   •  valsartan (DIOVAN) 320 MG tablet, Take 1 tablet by mouth Daily., Disp: 30 tablet, Rfl: 11  •  vitamin B-12 (CYANOCOBALAMIN) 1000 MCG tablet, Take 1,000 mcg by mouth Daily., Disp: , Rfl:   •  vitamin D (ERGOCALCIFEROL) 1.25 MG (40096 UT) capsule capsule, Take 50,000 Units by mouth 1 (One) Time Per Week., Disp: , Rfl:     Allergies:   No Known Allergies    Objective     Physical Exam:  Vital Signs:   Vitals:    10/06/22 1418 10/06/22 1518   BP: (!) 192/106 171/96   Pulse: 102    SpO2: 95%    Weight: 57.2 kg (126 lb)    Height: 162.6 cm (64\")      Body mass index is 21.63 kg/m².    Physical Exam  Constitutional:       General: She is not in acute distress.     Appearance: She is not toxic-appearing.   HENT:      Head: Normocephalic " and atraumatic.   Eyes:      Extraocular Movements: Extraocular movements intact.   Cardiovascular:      Rate and Rhythm: Regular rhythm.      Heart sounds: Normal heart sounds.      Comments: No ankle swelling noted, bilaterally.  Pulmonary:      Effort: Pulmonary effort is normal.      Breath sounds: Normal breath sounds.      Comments: Lungs clear to auscultation.  Abdominal:      General: There is no distension.   Musculoskeletal:      Cervical back: Neck supple.      Comments: Spinal kyphosis noted.   Skin:     General: Skin is warm and dry.      Findings: No rash.   Neurological:      General: No focal deficit present.      Mental Status: She is alert and oriented to person, place, and time.   Psychiatric:         Mood and Affect: Mood normal.         Behavior: Behavior normal.       Results Review:   Results for orders placed during the hospital encounter of 08/15/22    Adult Transthoracic Echo Complete W/ Cont if Necessary Per Protocol    Interpretation Summary  Borderline technically adequate study.    1.  LV size and global LV systolic function are preserved.  Visually estimated ejection fraction is 50 to 55%.  There is mild concentric left ventricular hypertrophy with grade 1A diastolic dysfunction.  Inferior wall hypokinesis is suggested in several views.  Moderate left atrial enlargement.  Right heart chambers are normal.  No septal defect or intracavitary mass or thrombus.    2.  There is minimal mitral annular calcification as well as thickening of both leaflets of mild sclerosis of the posterior mitral leaflet.  There is no mitral stenosis and there is moderate mitral regurgitation.  The aortic valve is grossly normally configured with no aortic stenosis and with trivial AI.  There is moderate tricuspid regurgitation from a morphologically normal valve.    3.  No pericardial or great vessel pathology.    4.  Moderate to severe pulmonary hypertension with PA systolic pressures estimated in the high  60s.    June 2022 chest x-ray showed hyperinflated lungs compatible with emphysema.  Small right perihilar opacity noted.    Assessment / Plan      Assessment/Plan:    Diagnoses and all orders for this visit:    1. Shortness of breath (Primary)  -     Pulmonary Function Test; Future  -     albuterol sulfate  (90 Base) MCG/ACT inhaler; Inhale 2 puffs Every 4 (Four) Hours As Needed for Wheezing or Shortness of Air.  Dispense: 6.7 g; Refill: 1  Likely multifactorial. May be an anginal equivalent given recent stress test results though patient declines to undergo LHC. Further assess with full PFTs.    2. Pulmonary emphysema, unspecified emphysema type (HCC)  -     Alpha - 1 - Antitrypsin Deficiency; Future  Suggested on recent chest imaging.  Obtain PFTs and alpha-1 testing.  Will discuss use of a maintenance inhaler regimen at next visit pending PFT review.    3. Pulmonary hypertension (HCC)  Likely multifactorial.  Patient declines to consider right heart catheterization.  Also declines to consider a sleep study at this time.  Recommend optimally treating any underlying conditions.  Patient to continue working with cardiology on blood pressure control.    4. Abnormal CXR  -     CT Chest Without Contrast Diagnostic; Future  Obtain chest CT scan to evaluate opacity noted on recent chest x-ray.    5. Post-nasal drip  -     mometasone (NASONEX) 50 MCG/ACT nasal spray; 2 sprays into the nostril(s) as directed by provider Daily.  Dispense: 17 g; Refill: 5  Likely contributes to cough.    6. Gastroesophageal reflux disease with esophagitis, unspecified whether hemorrhage  The patient is advised to avoid eating within 3 hours of bedtime and to monitor and avoid foods that aggravate her symptoms. She can also elevate the head of bed. If dietary modifications do not alleviate her symptoms, GERD treatment with medication will be considered.       Follow Up:   Return in about 3 months (around 1/6/2023) for Recheck, Follow  up after testing complete.  The patient was counseled on diagnostic results, risks and benefits of treatment options, risk factor modifications and the importance of treatment compliance. The patient was advised to contact the clinic with concerns or worsening symptoms.     TONJA Laura  Pulmonary [facility] [place]    Transcribed from ambient dictation for TONJA Laura by Viri Soto.  10/06/22   16:43 EDT    I have personally performed the services described in this document as transcribed by the above individual, and it is both accurate and complete.  Patient verbalized consent to the visit recording.

## 2022-11-14 DIAGNOSIS — J43.9 PULMONARY EMPHYSEMA, UNSPECIFIED EMPHYSEMA TYPE: ICD-10-CM

## 2022-11-23 DIAGNOSIS — R93.89 ABNORMAL CXR: ICD-10-CM

## 2022-12-07 DIAGNOSIS — R06.02 SHORTNESS OF BREATH: ICD-10-CM

## 2022-12-28 DIAGNOSIS — R06.02 SHORTNESS OF BREATH: ICD-10-CM

## 2022-12-28 RX ORDER — ALBUTEROL SULFATE 90 UG/1
2 AEROSOL, METERED RESPIRATORY (INHALATION) EVERY 4 HOURS PRN
Qty: 8.5 G | Refills: 1 | Status: SHIPPED | OUTPATIENT
Start: 2022-12-28

## 2023-03-13 ENCOUNTER — OFFICE VISIT (OUTPATIENT)
Dept: CARDIOLOGY | Facility: CLINIC | Age: 88
End: 2023-03-13
Payer: MEDICARE

## 2023-03-13 VITALS
WEIGHT: 125 LBS | BODY MASS INDEX: 21.34 KG/M2 | HEART RATE: 89 BPM | SYSTOLIC BLOOD PRESSURE: 178 MMHG | HEIGHT: 64 IN | DIASTOLIC BLOOD PRESSURE: 94 MMHG

## 2023-03-13 DIAGNOSIS — R06.02 SHORTNESS OF BREATH: Primary | ICD-10-CM

## 2023-03-13 DIAGNOSIS — I27.20 PULMONARY HYPERTENSION: ICD-10-CM

## 2023-03-13 DIAGNOSIS — R00.2 PALPITATIONS: ICD-10-CM

## 2023-03-13 PROCEDURE — 1159F MED LIST DOCD IN RCRD: CPT | Performed by: PHYSICIAN ASSISTANT

## 2023-03-13 PROCEDURE — 99213 OFFICE O/P EST LOW 20 MIN: CPT | Performed by: PHYSICIAN ASSISTANT

## 2023-03-13 PROCEDURE — 1160F RVW MEDS BY RX/DR IN RCRD: CPT | Performed by: PHYSICIAN ASSISTANT

## 2023-03-13 RX ORDER — AMLODIPINE BESYLATE 5 MG/1
5 TABLET ORAL DAILY
Qty: 30 TABLET | Refills: 11 | Status: SHIPPED | OUTPATIENT
Start: 2023-03-13

## 2023-03-13 NOTE — PROGRESS NOTES
Subjective   NOEL Rice is a 89 y.o. female     Chief Complaint   Patient presents with   • Follow-up     Pulmonary hypertension       HPI  The patient presents in the clinic today for follow-up.  We have seen her last evaluation where she had had testing because of dizziness and even a syncopal episode.  She also described increasing dyspnea, palpitations, and fatigue.  Follow-up studies were performed.  Echo supported low normal EF at 50 to 55%.  Inferior wall hypokinesis was suggested.  Also noted was moderate mitral regurgitation, pulmonary hypertension with PA systolic pressures in the mid to high 60s, and no significant abnormalities noted otherwise.  Monitor indicated sinus rhythm with PACs and PVCs.  There were short-lived episodes of SVT but no significant dysrhythmic activity otherwise.  Because of echo findings, she was scheduled for stress test.  This supported inferolateral, inferior, and inferoseptal wall MI pattern.  Post stress EF was at 50%.  We discussed consideration for catheterization at last evaluation and even today.  We were concerned about infarct pattern and low normal systolic function.  She felt well then and feels well now and would not want to pursue catheterization.    Clinically, the patient denies chest pain.  She has stable dyspnea and fatigue.  She denies failure nor dysrhythmic symptoms.  Exercise capacity is poor but mostly at baseline.  She does not feel that there is limitation because of cardiac issues with activity.  She is hypertensive today and typically always hypertensive when checked at home.  She feels that she did much better when she was on amlodipine previously and she would like to rechallenge that.  She has no further complaints at this time.      Current Outpatient Medications   Medication Sig Dispense Refill   • albuterol sulfate  (90 Base) MCG/ACT inhaler Inhale 2 puffs Every 4 (Four) Hours As Needed for Wheezing or Shortness of Air. 8.5 g 1   •  aspirin 81 MG EC tablet Take 1 tablet by mouth Daily.     • citalopram (CeleXA) 10 MG tablet Take 1 tablet by mouth Daily.     • cloNIDine (Catapres) 0.1 MG tablet Take 1 tablet by mouth 2 (Two) Times a Day. 60 tablet 11   • ferrous sulfate 324 (65 Fe) MG tablet delayed-release EC tablet Take 1 tablet by mouth As Needed.     • meclizine (ANTIVERT) 25 MG tablet Take 1 tablet by mouth 3 (Three) Times a Day As Needed for Dizziness.     • metFORMIN (GLUCOPHAGE) 1000 MG tablet Take 1 tablet by mouth 2 (Two) Times a Day With Meals.     • mometasone (NASONEX) 50 MCG/ACT nasal spray 2 sprays into the nostril(s) as directed by provider Daily. 17 g 5   • traZODone (DESYREL) 50 MG tablet Take 1 tablet by mouth As Needed.     • valsartan (DIOVAN) 320 MG tablet Take 1 tablet by mouth Daily. 30 tablet 11   • vitamin B-12 (CYANOCOBALAMIN) 1000 MCG tablet Take 1 tablet by mouth Daily.     • vitamin D (ERGOCALCIFEROL) 1.25 MG (96259 UT) capsule capsule Take 1 capsule by mouth 1 (One) Time Per Week.       No current facility-administered medications for this visit.       Patient has no known allergies.    Past Medical History:   Diagnosis Date   • Anxiety    • Diabetes mellitus (HCC)    • Hypertension    • Vitamin D deficiency        Social History     Socioeconomic History   • Marital status:    Tobacco Use   • Smoking status: Former     Years: 5.00     Types: Cigarettes   • Smokeless tobacco: Never   Substance and Sexual Activity   • Alcohol use: Not Currently   • Drug use: Never   • Sexual activity: Defer       Family History   Problem Relation Age of Onset   • Diabetes Mother    • Heart attack Father    • Heart attack Sister    • Lung cancer Sister        Review of Systems   Constitutional: Negative.  Negative for activity change, appetite change, chills, fatigue and fever.   HENT: Negative.  Negative for congestion.    Eyes: Negative.  Negative for visual disturbance.   Respiratory: Positive for cough. Negative for  "apnea, chest tightness, shortness of breath and wheezing.    Cardiovascular: Positive for leg swelling. Negative for chest pain and palpitations.   Gastrointestinal: Positive for blood in stool.   Endocrine: Negative.  Negative for cold intolerance and heat intolerance.   Genitourinary: Negative for hematuria.   Musculoskeletal: Positive for arthralgias, back pain and gait problem. Negative for neck pain and neck stiffness.   Skin: Negative.  Negative for color change, rash and wound.   Allergic/Immunologic: Negative.  Negative for environmental allergies and food allergies.   Neurological: Positive for dizziness and light-headedness. Negative for syncope, weakness, numbness and headaches.   Hematological: Bruises/bleeds easily (Bruises).   Psychiatric/Behavioral: Negative.  Negative for sleep disturbance.       Objective     Vitals:    03/13/23 1519   BP: 178/94   BP Location: Left arm   Patient Position: Sitting   Cuff Size: Adult   Pulse: 89   Weight: 56.7 kg (125 lb)   Height: 162.6 cm (64\")        /94 (BP Location: Left arm, Patient Position: Sitting, Cuff Size: Adult)   Pulse 89   Ht 162.6 cm (64\")   Wt 56.7 kg (125 lb)   BMI 21.46 kg/m²      Lab Results (most recent)     None          Physical Exam  Vitals and nursing note reviewed.   Constitutional:       General: She is not in acute distress.     Appearance: She is well-developed.   HENT:      Head: Normocephalic and atraumatic.   Eyes:      Conjunctiva/sclera: Conjunctivae normal.      Pupils: Pupils are equal, round, and reactive to light.   Neck:      Vascular: No JVD.      Trachea: No tracheal deviation.   Cardiovascular:      Rate and Rhythm: Normal rate and regular rhythm.      Heart sounds: Normal heart sounds.      Comments: 1/6 systolic murmur mid lower left sternal borders.  Pulmonary:      Effort: Pulmonary effort is normal.      Breath sounds: Normal breath sounds.   Abdominal:      General: Bowel sounds are normal. There is no " distension.      Palpations: Abdomen is soft. There is no mass.      Tenderness: There is no abdominal tenderness. There is no guarding or rebound.   Musculoskeletal:         General: No tenderness or deformity. Normal range of motion.      Cervical back: Normal range of motion and neck supple.      Right lower le+ Edema present.      Left lower le+ Edema present.   Skin:     General: Skin is warm and dry.      Coloration: Skin is not pale.      Findings: No erythema or rash.   Neurological:      Mental Status: She is alert and oriented to person, place, and time.   Psychiatric:         Behavior: Behavior normal.         Thought Content: Thought content normal.         Judgment: Judgment normal.         Procedure   Procedures         Assessment & Plan      Diagnosis Plan   1. Shortness of breath        2. Palpitations        3. Pulmonary hypertension (HCC)        1.  The patient is currently being evaluated through pulmonology given pulmonary hypertension and symptoms otherwise as outlined above.    2.  I am still concerned because of infarct pattern and low normal systolic function by noninvasive studies as outlined above.  We again discussed diagnostic evaluation via catheterization.  We can schedule left heart catheterization to evaluate coronary anatomy and right heart cath given pulmonary hypertension.  She feels well enough now that she wants to avoid further work-up.    3.  In that setting, we will continue medications for now.  I do feel she needs improved blood pressure control.  She feels that she responded extraordinarily well to amlodipine in the past.  I will rechallenge that.  She will continue antihypertensive medicines otherwise.  She will monitor blood pressures and call for complications.    4.  If she changes her mind on any of the above, she will call back.  We will continue to see the patient on 6-month intervals.           Patient did not bring med list or medicine bottles to  appointment, med list has been reviewed and updated based on patient's knowledge of their meds.     Advance Care Planning   ACP discussion was declined by the patient. Patient has an advance directive (not in EMR), copy requested.           Electronically signed by:

## 2023-03-14 ENCOUNTER — TELEPHONE (OUTPATIENT)
Dept: CARDIOLOGY | Facility: CLINIC | Age: 88
End: 2023-03-14
Payer: MEDICARE

## 2023-03-14 NOTE — TELEPHONE ENCOUNTER
Caller: LOR ESTEBAN    Relationship: Emergency Contact    Best call back number: [phone]    What is the best time to reach you: ANYTIME     Who are you requesting to speak with (clinical staff, provider,  specific staff member): ANYONE     What was the call regarding: PATIENTS DAUGHTER NEEDING CLARIFICATION ON THE MEDICATIONS PATIENT SHOULD BE TAKING.     Do you require a callback: YES

## 2023-03-14 NOTE — TELEPHONE ENCOUNTER
Explained Amlodipine was added as Valsartan and Clonidine was not keeping her BP down. Patient was on Exforge (Amlodipine and Valsartan combo) and would like to go back to the combo tablet. Advised if her BP does well after the 2 week period Chidi has her monitoring vitals then we can send in the combo tablet.

## 2023-04-06 ENCOUNTER — OFFICE VISIT (OUTPATIENT)
Dept: PULMONOLOGY | Facility: CLINIC | Age: 88
End: 2023-04-06
Payer: MEDICARE

## 2023-04-06 VITALS
SYSTOLIC BLOOD PRESSURE: 131 MMHG | HEIGHT: 64 IN | OXYGEN SATURATION: 97 % | RESPIRATION RATE: 18 BRPM | DIASTOLIC BLOOD PRESSURE: 81 MMHG | WEIGHT: 126 LBS | HEART RATE: 81 BPM | BODY MASS INDEX: 21.51 KG/M2

## 2023-04-06 DIAGNOSIS — I27.20 PULMONARY HYPERTENSION: ICD-10-CM

## 2023-04-06 DIAGNOSIS — J44.9 CHRONIC OBSTRUCTIVE PULMONARY DISEASE, UNSPECIFIED COPD TYPE: ICD-10-CM

## 2023-04-06 DIAGNOSIS — J47.9 BRONCHIECTASIS WITHOUT COMPLICATION: ICD-10-CM

## 2023-04-06 DIAGNOSIS — R06.02 SHORTNESS OF BREATH: Primary | ICD-10-CM

## 2023-04-06 DIAGNOSIS — Z14.8 ALPHA-1-ANTITRYPSIN DEFICIENCY CARRIER: ICD-10-CM

## 2023-04-06 RX ORDER — BUDESONIDE, GLYCOPYRROLATE, AND FORMOTEROL FUMARATE 160; 9; 4.8 UG/1; UG/1; UG/1
2 AEROSOL, METERED RESPIRATORY (INHALATION) 2 TIMES DAILY
Qty: 3 EACH | Refills: 3 | Status: SHIPPED | OUTPATIENT
Start: 2023-04-06

## 2023-04-06 RX ORDER — FERROUS SULFATE 325(65) MG
1 TABLET ORAL DAILY
COMMUNITY
Start: 2023-03-27

## 2023-04-06 RX ORDER — CITALOPRAM 20 MG/1
1 TABLET ORAL DAILY
COMMUNITY
Start: 2023-03-27

## 2023-04-06 NOTE — PROGRESS NOTES
"     Follow Up Office Visit      Patient Name: NOEL Rice    Chief Complaint:    Chief Complaint   Patient presents with   • Shortness of Breath   • Follow-up       History of Present Illness: NOEL Rice is a 89 y.o. female who is here today for follow up of shortness of breath, emphysema, and pulmonary hypertension. Since last visit, PFTs showed moderate obstruction with significant bronchodilator response. Chest CT scan showed some mild bronchiectasis, but otherwise no concerning findings in the lungs. Atherosclerotic plaque noted in the LAD, the patient has declined to undergo left heart catheterization.    The patient is accompanied by an adult female. The patient reports experiencing shortness of breath with exertion. She adds the shortness of breath \"sometimes lasts a little while.\" She reports having a productive cough, which has been around for a while. She denies any fevers or hemoptysis. She notes her weight is stable. She reports experiencing occasional wheezing. She denies any chest pain. She reports experiencing tachycardia with exertion. She confirms having an albuterol inhaler, which she uses as needed, which has been beneficial.     Supplemental Oxygen: No    Subjective      Review of Systems:  Review of Systems   Constitutional: Negative for fever and unexpected weight change.   Respiratory: Positive for cough, shortness of breath and wheezing.    Cardiovascular: Negative for chest pain and leg swelling.        Past Medical History:   Past Medical History:   Diagnosis Date   • Anxiety    • Diabetes mellitus    • Hypertension    • Vitamin D deficiency        Past Surgical History:   Past Surgical History:   Procedure Laterality Date   • CHOLECYSTECTOMY     • HYSTERECTOMY         Family History:   Family History   Problem Relation Age of Onset   • Diabetes Mother    • Heart attack Father    • Heart attack Sister    • Lung cancer Sister        Social History:   Social History     Socioeconomic " History   • Marital status:    Tobacco Use   • Smoking status: Former     Years: 5.00     Types: Cigarettes   • Smokeless tobacco: Never   • Tobacco comments:     Its been quite awhile since she quit smoking    Substance and Sexual Activity   • Alcohol use: Not Currently   • Drug use: Never   • Sexual activity: Defer       Current Medications:     Current Outpatient Medications:   •  albuterol sulfate  (90 Base) MCG/ACT inhaler, Inhale 2 puffs Every 4 (Four) Hours As Needed for Wheezing or Shortness of Air., Disp: 8.5 g, Rfl: 1  •  amLODIPine (NORVASC) 5 MG tablet, Take 1 tablet by mouth Daily., Disp: 30 tablet, Rfl: 11  •  aspirin 81 MG EC tablet, Take 1 tablet by mouth Daily., Disp: , Rfl:   •  citalopram (CeleXA) 20 MG tablet, Take 1 tablet by mouth Daily., Disp: , Rfl:   •  cloNIDine (Catapres) 0.1 MG tablet, Take 1 tablet by mouth 2 (Two) Times a Day., Disp: 60 tablet, Rfl: 11  •  FeroSul 325 (65 Fe) MG tablet, Take 1 tablet by mouth Daily. with food., Disp: , Rfl:   •  ferrous sulfate 324 (65 Fe) MG tablet delayed-release EC tablet, Take 1 tablet by mouth As Needed., Disp: , Rfl:   •  meclizine (ANTIVERT) 25 MG tablet, Take 1 tablet by mouth 3 (Three) Times a Day As Needed for Dizziness., Disp: , Rfl:   •  metFORMIN (GLUCOPHAGE) 1000 MG tablet, Take 1 tablet by mouth 2 (Two) Times a Day With Meals., Disp: , Rfl:   •  mometasone (NASONEX) 50 MCG/ACT nasal spray, 2 sprays into the nostril(s) as directed by provider Daily., Disp: 17 g, Rfl: 5  •  traZODone (DESYREL) 50 MG tablet, Take 1 tablet by mouth As Needed., Disp: , Rfl:   •  valsartan (DIOVAN) 320 MG tablet, Take 1 tablet by mouth Daily., Disp: 30 tablet, Rfl: 11  •  vitamin B-12 (CYANOCOBALAMIN) 1000 MCG tablet, Take 1 tablet by mouth Daily., Disp: , Rfl:   •  vitamin D (ERGOCALCIFEROL) 1.25 MG (30382 UT) capsule capsule, Take 1 capsule by mouth 1 (One) Time Per Week., Disp: , Rfl:   •  citalopram (CeleXA) 10 MG tablet, Take 1 tablet by  "mouth Daily. (Patient not taking: Reported on 4/6/2023), Disp: , Rfl:      Allergies:   No Known Allergies    Objective     Physical Exam:  Vital Signs:   Vitals:    04/06/23 1432   BP: 131/81   Pulse: 81   Resp: 18   SpO2: 97%   Weight: 57.2 kg (126 lb)   Height: 162.6 cm (64\")     Body mass index is 21.63 kg/m².    Physical Exam  Vitals and nursing note reviewed.   Constitutional:       General: She is not in acute distress.     Appearance: She is not toxic-appearing.   HENT:      Head: Normocephalic and atraumatic.      Right Ear: External ear normal.      Left Ear: External ear normal.      Nose: Nose normal.      Mouth/Throat:      Mouth: Mucous membranes are moist.      Pharynx: No oropharyngeal exudate or posterior oropharyngeal erythema.   Eyes:      Extraocular Movements: Extraocular movements intact.      Conjunctiva/sclera: Conjunctivae normal.      Pupils: Pupils are equal, round, and reactive to light.   Cardiovascular:      Rate and Rhythm: Normal rate and regular rhythm.      Heart sounds: No murmur heard.  Pulmonary:      Effort: No respiratory distress.      Breath sounds: No wheezing or rhonchi.   Abdominal:      General: There is no distension.      Palpations: Abdomen is soft.   Musculoskeletal:         General: Normal range of motion.      Cervical back: Neck supple.   Lymphadenopathy:      Cervical: No cervical adenopathy.   Skin:     General: Skin is warm and dry.      Findings: No rash.   Neurological:      General: No focal deficit present.      Mental Status: She is alert and oriented to person, place, and time.   Psychiatric:         Behavior: Behavior normal.       Results Review:   November 2022 PFT showed moderate obstructive pattern with significant bronchodilator response.  Postbronchodilator FEV1 of 68%, FEV1/FVC ratio 60.  Air trapping.  Reduced DLCO though DLCO/VA is 130.    October 2022 alpha-1 antitrypsin genotype MS.    November 2022 chest CT scan showed scattered areas of mild " bronchiectasis on the right.  No alveolar consolidation or abnormal mass.  Moderate atherosclerotic plaque formation involving the LAD.  Cholecystectomy.  Nonspecific 2.0 cm dilated common bile duct.    Assessment / Plan      Assessment/Plan:   Diagnoses and all orders for this visit:    1. Shortness of breath (Primary)  Treat for COPD as noted below. Continue to follow with cardiology.    2. Chronic obstructive pulmonary disease, unspecified COPD type  -     Budeson-Glycopyrrol-Formoterol (Breztri Aerosphere) 160-9-4.8 MCG/ACT aerosol inhaler; Inhale 2 puffs 2 (Two) Times a Day. Rinse mouth out after use  Dispense: 3 each; Refill: 3  PFT results reviewed and discussed with patient.  Begin treatment with use of Breztri.  We discussed the risk and benefits of inhaled corticosteroids. Patient instructed to take them on a regular basis as prescribed. Patient instructed to rinse their mouth out after each use. Appropriate inhaler technique demonstrated today in clinic. Patient instructed to contact their insurance company and make sure that the medications prescribed are on their formulary and the lowest cost/tier for them. They will call the clinic back if different medications need to prescribed.     3. Pulmonary hypertension  Patient declines to consider right heart catheterization or a sleep study. Treat any apparent contributing conditions.    4. Bronchiectasis without complication  No exacerbation symptoms/    5. Alpha-1-antitrypsin deficiency carrier  Carrier status, and therefore no treatment is necessary. Will continue to monitor yearly PFTs. Patient was advised of the possibility of children and grandchildren having the disease or being carriers. The patient was advised that their family members could and should be tested if desired.        Follow Up:   Return in about 6 months (around 10/6/2023) for Recheck.  The patient was counseled on diagnostic results, risks and benefits of treatment options, risk factor  modifications and the importance of treatment compliance. The patient was advised to contact the clinic with concerns or worsening symptoms.     TONJA Laura   Pulmonary [facility] [place]     Transcribed from ambient dictation for TONJA Laura by Ricky Haq.  04/06/23   16:19 EDT    Patient or patient representative verbalized consent to the visit recording.  I have personally performed the services described in this document as transcribed by the above individual, and it is both accurate and complete.

## 2023-09-25 ENCOUNTER — TELEPHONE (OUTPATIENT)
Dept: CARDIOLOGY | Facility: CLINIC | Age: 88
End: 2023-09-25

## 2023-09-25 NOTE — TELEPHONE ENCOUNTER
Received a call from pt's dtr [place] in regards to the [facility] contract. [place] will call [facility], let us know if they are going to keep appt or not.

## 2023-09-27 ENCOUNTER — OFFICE VISIT (OUTPATIENT)
Dept: CARDIOLOGY | Facility: CLINIC | Age: 88
End: 2023-09-27
Payer: MEDICARE

## 2023-09-27 VITALS
SYSTOLIC BLOOD PRESSURE: 120 MMHG | BODY MASS INDEX: 22.98 KG/M2 | DIASTOLIC BLOOD PRESSURE: 74 MMHG | HEART RATE: 92 BPM | HEIGHT: 64 IN | OXYGEN SATURATION: 96 % | WEIGHT: 134.6 LBS

## 2023-09-27 DIAGNOSIS — I27.20 PULMONARY HYPERTENSION: ICD-10-CM

## 2023-09-27 DIAGNOSIS — R06.02 SHORTNESS OF BREATH: Primary | ICD-10-CM

## 2023-09-27 DIAGNOSIS — R00.2 PALPITATIONS: ICD-10-CM

## 2023-09-27 PROCEDURE — 1160F RVW MEDS BY RX/DR IN RCRD: CPT | Performed by: PHYSICIAN ASSISTANT

## 2023-09-27 PROCEDURE — 1159F MED LIST DOCD IN RCRD: CPT | Performed by: PHYSICIAN ASSISTANT

## 2023-09-27 PROCEDURE — 93000 ELECTROCARDIOGRAM COMPLETE: CPT | Performed by: PHYSICIAN ASSISTANT

## 2023-09-27 PROCEDURE — 99213 OFFICE O/P EST LOW 20 MIN: CPT | Performed by: PHYSICIAN ASSISTANT

## 2023-09-27 NOTE — PROGRESS NOTES
Problem list     Subjective   NOEL Rice is a 89 y.o. female     Chief Complaint   Patient presents with    Follow-up     6 month follow up       HPI  The patient presents in the clinic today for routine evaluation and follow-up.  She was initially seen in the setting of dizziness, presyncope, and even syncope.  She was scheduled for testing.  Echo supported an EF of 50 to 55% with inferior hypokinesis noted.  There is moderate mitral regurgitation, pulmonary hypertension with PA systolic pressures in the mid to high 60s, no significant abnormalities noted otherwise.  Monitor indicated sinus rhythm with PACs and PVCs, short-lived episodes of SVT, and no significant abnormalities otherwise.  Stress test was performed which indicated inferolateral, inferior, and inferoseptal wall MI.  Post stress EF was at 50%.  We discussed consideration for pulmonary evaluation because of pulmonary hypertension.  We discussed further evaluation with catheterization to evaluate coronary anatomy and even mitral regurgitation.  She wants no further evaluation.  She confirms this again today.    Clinically, the patient tells me she feels fine now.  Dyspnea and fatigue are at baseline.  She has had no further dizziness and certainly no syncope.  She denies chest pain.  She has no failure nor dysrhythmic symptoms.  Blood pressures are typically well controlled.  She does report that laboratories are managed closely and followed closely by her primary care provider.  We have requested copies of those.  The patient has no further complaints and feels that she is doing fairly well.    Current Outpatient Medications on File Prior to Visit   Medication Sig Dispense Refill    albuterol sulfate  (90 Base) MCG/ACT inhaler Inhale 2 puffs Every 4 (Four) Hours As Needed for Wheezing or Shortness of Air. 8.5 g 1    amLODIPine (NORVASC) 5 MG tablet Take 1 tablet by mouth Daily. 30 tablet 11    aspirin 81 MG EC tablet Take 1 tablet by mouth  Daily.      Budeson-Glycopyrrol-Formoterol (Breztri Aerosphere) 160-9-4.8 MCG/ACT aerosol inhaler Inhale 2 puffs 2 (Two) Times a Day. Rinse mouth out after use 3 each 3    citalopram (CeleXA) 10 MG tablet Take 1 tablet by mouth Daily.      citalopram (CeleXA) 20 MG tablet Take 1 tablet by mouth Daily.      cloNIDine (Catapres) 0.1 MG tablet Take 1 tablet by mouth 2 (Two) Times a Day. 60 tablet 11    FeroSul 325 (65 Fe) MG tablet Take 1 tablet by mouth Daily. with food.      meclizine (ANTIVERT) 25 MG tablet Take 1 tablet by mouth 3 (Three) Times a Day As Needed for Dizziness.      metFORMIN (GLUCOPHAGE) 1000 MG tablet Take 1 tablet by mouth 2 (Two) Times a Day With Meals.      mometasone (NASONEX) 50 MCG/ACT nasal spray 2 sprays into the nostril(s) as directed by provider Daily. 17 g 5    traZODone (DESYREL) 50 MG tablet Take 1 tablet by mouth As Needed.      valsartan (DIOVAN) 320 MG tablet Take 1 tablet by mouth Daily. 30 tablet 11    vitamin B-12 (CYANOCOBALAMIN) 1000 MCG tablet Take 1 tablet by mouth Daily.      vitamin D (ERGOCALCIFEROL) 1.25 MG (17912 UT) capsule capsule Take 1 capsule by mouth 1 (One) Time Per Week.      [DISCONTINUED] ferrous sulfate 324 (65 Fe) MG tablet delayed-release EC tablet Take 1 tablet by mouth As Needed.       No current facility-administered medications on file prior to visit.       Patient has no known allergies.    Past Medical History:   Diagnosis Date    Anxiety     Diabetes mellitus     Hypertension     Vitamin D deficiency        Social History     Socioeconomic History    Marital status:    Tobacco Use    Smoking status: Former     Years: 5.00     Types: Cigarettes    Smokeless tobacco: Never    Tobacco comments:     Its been quite awhile since she quit smoking    Substance and Sexual Activity    Alcohol use: Not Currently    Drug use: Never    Sexual activity: Defer       Family History   Problem Relation Age of Onset    Diabetes Mother     Heart attack Father      "Heart attack Sister     Lung cancer Sister        Review of Systems   Constitutional: Negative.  Negative for chills, diaphoresis, fatigue and fever.   HENT:  Positive for postnasal drip.    Eyes: Negative.  Negative for visual disturbance (wears glasses).   Respiratory: Negative.  Negative for apnea, cough, chest tightness, shortness of breath and wheezing.    Cardiovascular: Negative.  Negative for chest pain, palpitations and leg swelling.   Gastrointestinal:  Positive for blood in stool (hemorrhoids). Negative for abdominal pain.   Endocrine: Negative.    Genitourinary: Negative.  Negative for hematuria.   Musculoskeletal:  Positive for arthralgias (knee's, hands). Negative for back pain, myalgias, neck pain and neck stiffness.   Skin: Negative.  Negative for rash and wound.   Allergic/Immunologic: Positive for environmental allergies (seasonal allergies). Negative for food allergies.   Neurological:  Positive for light-headedness and numbness (feet and legs). Negative for dizziness, syncope, weakness and headaches.   Hematological:  Bruises/bleeds easily (bruises easily).   Psychiatric/Behavioral: Negative.  Negative for sleep disturbance.      Objective   Vitals:    09/27/23 1322   BP: 120/74   BP Location: Left arm   Patient Position: Sitting   Cuff Size: Adult   Pulse: 92   SpO2: 96%   Weight: 61.1 kg (134 lb 9.6 oz)   Height: 162.6 cm (64\")      /74 (BP Location: Left arm, Patient Position: Sitting, Cuff Size: Adult)   Pulse 92   Ht 162.6 cm (64\")   Wt 61.1 kg (134 lb 9.6 oz)   SpO2 96%   BMI 23.10 kg/m²    Lab Results (most recent)       None          Physical Exam  Vitals and nursing note reviewed.   Constitutional:       General: She is not in acute distress.     Appearance: She is well-developed.   HENT:      Head: Normocephalic and atraumatic.   Eyes:      Conjunctiva/sclera: Conjunctivae normal.      Pupils: Pupils are equal, round, and reactive to light.   Neck:      Vascular: No JVD.      " Trachea: No tracheal deviation.   Cardiovascular:      Rate and Rhythm: Normal rate and regular rhythm.      Heart sounds: Normal heart sounds.      Comments: Soft systolic murmur mid lower left sternal border noted.  Pulmonary:      Effort: Pulmonary effort is normal.      Breath sounds: Normal breath sounds.   Abdominal:      General: Bowel sounds are normal. There is no distension.      Palpations: Abdomen is soft. There is no mass.      Tenderness: There is no abdominal tenderness. There is no guarding or rebound.   Musculoskeletal:         General: No tenderness or deformity. Normal range of motion.      Cervical back: Normal range of motion and neck supple.   Skin:     General: Skin is warm and dry.      Coloration: Skin is not pale.      Findings: No erythema or rash.   Neurological:      Mental Status: She is alert and oriented to person, place, and time.   Psychiatric:         Behavior: Behavior normal.         Thought Content: Thought content normal.         Judgment: Judgment normal.         Procedure     ECG 12 Lead    Date/Time: 9/27/2023 1:28 PM  Performed by: Chidi Perdue PA  Authorized by: Chidi Perdue PA   Comparison: compared with previous ECG from 6/15/2022  Comparison to previous ECG: Sinus rhythm, rate 87, right bundle branch block, left anterior fascicular block, no acute changes noted.           Assessment & Plan      Diagnosis Plan   1. Shortness of breath        2. Pulmonary hypertension        3. Palpitations          1.  At this time, the patient reports complete stability from cardiovascular standpoint.  Dyspnea is at baseline.  Palpitations are now nonproblematic.  She has no further cardiovascular symptoms or issues.    2.  We again reviewed previous stress, echo, and monitor findings as above.  We have discussed once again consideration for invasive cardiac evaluation/catheterization, pulmonary evaluation given pulmonary hypertension, etc. as above.  She again confirms she  wants no further evaluation.    3.  In this setting, we will continue medications.    4.  She will report back immediately for any issues.  We will see her now on a yearly evaluation, sooner for issues.         Advance Care Planning   ACP discussion was held with the patient during this visit. Patient has an advance directive (not in EMR), copy requested.         Electronically signed by:

## 2023-10-12 ENCOUNTER — OFFICE VISIT (OUTPATIENT)
Dept: PULMONOLOGY | Facility: CLINIC | Age: 88
End: 2023-10-12
Payer: MEDICARE

## 2023-10-12 VITALS
HEART RATE: 92 BPM | OXYGEN SATURATION: 96 % | HEIGHT: 64 IN | SYSTOLIC BLOOD PRESSURE: 137 MMHG | WEIGHT: 136 LBS | BODY MASS INDEX: 23.22 KG/M2 | DIASTOLIC BLOOD PRESSURE: 66 MMHG

## 2023-10-12 DIAGNOSIS — J44.9 CHRONIC OBSTRUCTIVE PULMONARY DISEASE, UNSPECIFIED COPD TYPE: Primary | ICD-10-CM

## 2023-10-12 DIAGNOSIS — J47.9 BRONCHIECTASIS WITHOUT COMPLICATION: ICD-10-CM

## 2023-10-12 PROCEDURE — 99213 OFFICE O/P EST LOW 20 MIN: CPT | Performed by: NURSE PRACTITIONER

## 2023-10-12 RX ORDER — BUDESONIDE, GLYCOPYRROLATE, AND FORMOTEROL FUMARATE 160; 9; 4.8 UG/1; UG/1; UG/1
2 AEROSOL, METERED RESPIRATORY (INHALATION) 2 TIMES DAILY
Qty: 3 EACH | Refills: 3 | Status: SHIPPED | OUTPATIENT
Start: 2023-10-12

## 2023-10-12 NOTE — PROGRESS NOTES
"     Follow Up Office Visit      Patient Name: NOEL Rice    Chief Complaint:    Chief Complaint   Patient presents with    Shortness of Breath    Follow-up       History of Present Illness: NOEL Rice is a 89 y.o. female who is here today for follow up of COPD and bronchiectasis. Since  last visit, Breztri has been beneficial. She has only used albuterol on one occasion. No longer feeling winded. Notes only a \"small cough\" on occasionally, which is dry.    Supplemental Oxygen: No    Subjective      Review of Systems:  Review of Systems   Constitutional:  Negative for fatigue, fever and unexpected weight change.   Respiratory:  Negative for wheezing.         See HPI   Cardiovascular:  Negative for chest pain and leg swelling.        Past Medical History:   Past Medical History:   Diagnosis Date    Anxiety     Diabetes mellitus     Hypertension     Vitamin D deficiency        Past Surgical History:   Past Surgical History:   Procedure Laterality Date    CHOLECYSTECTOMY      HYSTERECTOMY         Family History:   Family History   Problem Relation Age of Onset    Diabetes Mother     Heart attack Father     Heart attack Sister     Lung cancer Sister        Social History:   Social History     Socioeconomic History    Marital status:    Tobacco Use    Smoking status: Former     Packs/day: 1.00     Years: 5.00     Additional pack years: 0.00     Total pack years: 5.00     Types: Cigarettes    Smokeless tobacco: Never    Tobacco comments:     Its been quite awhile since she quit smoking    Vaping Use    Vaping Use: Never used   Substance and Sexual Activity    Alcohol use: Not Currently    Drug use: Never    Sexual activity: Defer       Current Medications:     Current Outpatient Medications:     albuterol sulfate  (90 Base) MCG/ACT inhaler, Inhale 2 puffs Every 4 (Four) Hours As Needed for Wheezing or Shortness of Air., Disp: 8.5 g, Rfl: 1    amLODIPine (NORVASC) 5 MG tablet, Take 1 tablet by mouth " "Daily., Disp: 30 tablet, Rfl: 11    aspirin 81 MG EC tablet, Take 1 tablet by mouth Daily., Disp: , Rfl:     Budeson-Glycopyrrol-Formoterol (Breztri Aerosphere) 160-9-4.8 MCG/ACT aerosol inhaler, Inhale 2 puffs 2 (Two) Times a Day. Rinse mouth out after use, Disp: 3 each, Rfl: 3    citalopram (CeleXA) 10 MG tablet, Take 1 tablet by mouth Daily., Disp: , Rfl:     cloNIDine (Catapres) 0.1 MG tablet, Take 1 tablet by mouth 2 (Two) Times a Day., Disp: 60 tablet, Rfl: 11    FeroSul 325 (65 Fe) MG tablet, Take 1 tablet by mouth Daily. with food., Disp: , Rfl:     meclizine (ANTIVERT) 25 MG tablet, Take 1 tablet by mouth 3 (Three) Times a Day As Needed for Dizziness., Disp: , Rfl:     metFORMIN (GLUCOPHAGE) 1000 MG tablet, Take 1 tablet by mouth 2 (Two) Times a Day With Meals., Disp: , Rfl:     mometasone (NASONEX) 50 MCG/ACT nasal spray, 2 sprays into the nostril(s) as directed by provider Daily., Disp: 17 g, Rfl: 5    traZODone (DESYREL) 50 MG tablet, Take 1 tablet by mouth As Needed., Disp: , Rfl:     valsartan (DIOVAN) 320 MG tablet, Take 1 tablet by mouth Daily., Disp: 30 tablet, Rfl: 11    vitamin B-12 (CYANOCOBALAMIN) 1000 MCG tablet, Take 1 tablet by mouth Daily., Disp: , Rfl:     vitamin D (ERGOCALCIFEROL) 1.25 MG (17904 UT) capsule capsule, Take 1 capsule by mouth 1 (One) Time Per Week., Disp: , Rfl:     citalopram (CeleXA) 20 MG tablet, Take 1 tablet by mouth Daily. (Patient not taking: Reported on 10/12/2023), Disp: , Rfl:      Allergies:   No Known Allergies    Objective     Physical Exam:  Vital Signs:   Vitals:    10/12/23 1310   BP: 137/66   Pulse: 92   SpO2: 96%   Weight: 61.7 kg (136 lb)   Height: 162.6 cm (64\")     Body mass index is 23.34 kg/m².    Physical Exam  Vitals reviewed.   Constitutional:       General: She is not in acute distress.     Appearance: She is not toxic-appearing.   HENT:      Head: Normocephalic and atraumatic.      Mouth/Throat:      Mouth: Mucous membranes are moist.   Eyes:      " Conjunctiva/sclera: Conjunctivae normal.   Cardiovascular:      Rate and Rhythm: Normal rate.      Heart sounds: Normal heart sounds.   Pulmonary:      Effort: Pulmonary effort is normal.      Breath sounds: Normal breath sounds.   Abdominal:      General: There is no distension.      Palpations: Abdomen is soft.   Musculoskeletal:         General: No swelling.      Cervical back: Neck supple.   Skin:     General: Skin is warm and dry.      Findings: No rash.   Neurological:      General: No focal deficit present.      Mental Status: She is alert and oriented to person, place, and time.   Psychiatric:         Mood and Affect: Mood normal.         Behavior: Behavior normal.       Assessment / Plan      Assessment/Plan:   Diagnoses and all orders for this visit:    1. Chronic obstructive pulmonary disease, unspecified COPD type (Primary)  -     Budeson-Glycopyrrol-Formoterol (Breztri Aerosphere) 160-9-4.8 MCG/ACT aerosol inhaler; Inhale 2 puffs 2 (Two) Times a Day. Rinse mouth out after use  Dispense: 3 each; Refill: 3  Patient is doing very well on current inhaled regimen. We discussed the risk and benefits of inhaled corticosteroids. Patient instructed to take them on a regular basis as prescribed. Patient instructed to rinse their mouth out after each use.     2. Bronchiectasis without complication  No current exacerbation symptoms.       Patient request to follow-up in 1 year rather than 6 months.    Follow Up:   Return in about 1 year (around 10/12/2024) for Recheck.  The patient was counseled on diagnostic results, risks and benefits of treatment options, risk factor modifications and the importance of treatment compliance. The patient was advised to contact the clinic with concerns or worsening symptoms.     TONJA Laura   Pulmonary Medicine [place]

## 2024-09-30 ENCOUNTER — OFFICE VISIT (OUTPATIENT)
Dept: CARDIOLOGY | Facility: CLINIC | Age: 89
End: 2024-09-30
Payer: MEDICARE

## 2024-09-30 VITALS
SYSTOLIC BLOOD PRESSURE: 126 MMHG | DIASTOLIC BLOOD PRESSURE: 79 MMHG | WEIGHT: 139 LBS | OXYGEN SATURATION: 94 % | BODY MASS INDEX: 23.73 KG/M2 | HEART RATE: 104 BPM | HEIGHT: 64 IN

## 2024-09-30 DIAGNOSIS — I27.20 PULMONARY HTN: ICD-10-CM

## 2024-09-30 DIAGNOSIS — I10 PRIMARY HYPERTENSION: ICD-10-CM

## 2024-09-30 DIAGNOSIS — R06.09 DYSPNEA ON EXERTION: Primary | ICD-10-CM

## 2024-09-30 PROCEDURE — 99213 OFFICE O/P EST LOW 20 MIN: CPT | Performed by: PHYSICIAN ASSISTANT

## 2024-09-30 PROCEDURE — 1160F RVW MEDS BY RX/DR IN RCRD: CPT | Performed by: PHYSICIAN ASSISTANT

## 2024-09-30 PROCEDURE — 1159F MED LIST DOCD IN RCRD: CPT | Performed by: PHYSICIAN ASSISTANT

## 2024-09-30 PROCEDURE — 93000 ELECTROCARDIOGRAM COMPLETE: CPT | Performed by: PHYSICIAN ASSISTANT

## 2024-09-30 RX ORDER — VALSARTAN 320 MG/1
320 TABLET ORAL DAILY
Qty: 30 TABLET | Refills: 11 | Status: SHIPPED | OUTPATIENT
Start: 2024-09-30

## 2024-09-30 NOTE — PROGRESS NOTES
Problem list     Subjective   NOEL Rice is a 89 y.o. female     Chief Complaint   Patient presents with    Follow-up     Yearly follow up     Shortness of Breath    Fatigue    Edema   HPI:    The patient presents in the clinic today for routine evaluation and follow-up.  She was initially seen in the setting of dizziness, presyncope, and even syncope.  She was scheduled for testing.  Echo supported an EF of 50 to 55% with inferior hypokinesis noted.  There is moderate mitral regurgitation, pulmonary hypertension with PA systolic pressures in the mid to high 60s, no significant abnormalities noted otherwise.  Monitor indicated sinus rhythm with PACs and PVCs, short-lived episodes of SVT, and no significant abnormalities otherwise.  Stress test was performed which indicated inferolateral, inferior, and inferoseptal wall MI.  Post stress EF was at 50%.  We discussed consideration for pulmonary evaluation because of pulmonary hypertension.  We discussed further evaluation with catheterization to evaluate coronary anatomy and even mitral regurgitation.  She wants no further evaluation.  She confirms this again today.    Clinically, the patient done reasonably well since last evaluation.  She has a degree of dyspnea and fatigue which she relates to age and deconditioning.  She denies chest pain.  She has had no failure issues of any significance, with the exception of mild edema intermittently.  She reports adequate exercise capacity for her age.  She has no limitation because of angina.  She has no further complaints.           Current Outpatient Medications on File Prior to Visit   Medication Sig Dispense Refill    albuterol sulfate  (90 Base) MCG/ACT inhaler Inhale 2 puffs Every 4 (Four) Hours As Needed for Wheezing or Shortness of Air. 8.5 g 1    amLODIPine (NORVASC) 5 MG tablet Take 1 tablet by mouth Daily. 30 tablet 11    aspirin 81 MG EC tablet Take 1 tablet by mouth Daily.       Budeson-Glycopyrrol-Formoterol (Breztri Aerosphere) 160-9-4.8 MCG/ACT aerosol inhaler Inhale 2 puffs 2 (Two) Times a Day. Rinse mouth out after use 3 each 3    citalopram (CeleXA) 20 MG tablet Take 1 tablet by mouth Daily.      cloNIDine (Catapres) 0.1 MG tablet Take 1 tablet by mouth 2 (Two) Times a Day. (Patient taking differently: Take 1 tablet by mouth Daily.) 60 tablet 11    FeroSul 325 (65 Fe) MG tablet Take 1 tablet by mouth Daily. with food.      LUTEIN PO Take  by mouth.      metFORMIN (GLUCOPHAGE) 1000 MG tablet Take 1 tablet by mouth 2 (Two) Times a Day With Meals.      valsartan (DIOVAN) 320 MG tablet Take 1 tablet by mouth Daily. 30 tablet 11    vitamin B-12 (CYANOCOBALAMIN) 1000 MCG tablet Take 1 tablet by mouth Daily.      vitamin D (ERGOCALCIFEROL) 1.25 MG (45982 UT) capsule capsule Take 1 capsule by mouth 1 (One) Time Per Week.      [DISCONTINUED] citalopram (CeleXA) 10 MG tablet Take 1 tablet by mouth Daily.      [DISCONTINUED] meclizine (ANTIVERT) 25 MG tablet Take 1 tablet by mouth 3 (Three) Times a Day As Needed for Dizziness.      [DISCONTINUED] mometasone (NASONEX) 50 MCG/ACT nasal spray 2 sprays into the nostril(s) as directed by provider Daily. 17 g 5    [DISCONTINUED] traZODone (DESYREL) 50 MG tablet Take 1 tablet by mouth As Needed.       No current facility-administered medications on file prior to visit.       Patient has no known allergies.    Past Medical History:   Diagnosis Date    Anxiety     Diabetes mellitus     Hypertension     Vitamin D deficiency        Social History     Socioeconomic History    Marital status:    Tobacco Use    Smoking status: Former     Current packs/day: 1.00     Average packs/day: 1 pack/day for 5.0 years (5.0 ttl pk-yrs)     Types: Cigarettes    Smokeless tobacco: Never    Tobacco comments:     Its been quite awhile since she quit smoking    Vaping Use    Vaping status: Never Used   Substance and Sexual Activity    Alcohol use: Not Currently     "Drug use: Never    Sexual activity: Defer       Family History   Problem Relation Age of Onset    Diabetes Mother     Heart attack Father     Heart attack Sister     Lung cancer Sister        Review of Systems   Constitutional:  Positive for fatigue. Negative for chills, diaphoresis and fever.   HENT: Negative.     Eyes:  Positive for visual disturbance.   Respiratory:  Positive for shortness of breath. Negative for apnea, cough, chest tightness and wheezing.    Cardiovascular:  Positive for leg swelling. Negative for chest pain and palpitations.   Gastrointestinal: Negative.  Negative for abdominal pain and blood in stool.   Endocrine: Negative.    Genitourinary: Negative.  Negative for hematuria.   Musculoskeletal:  Positive for arthralgias and gait problem (multiple falls). Negative for back pain, myalgias, neck pain and neck stiffness.   Skin:  Negative for rash and wound.   Allergic/Immunologic: Negative.  Negative for environmental allergies and food allergies.   Neurological:  Positive for dizziness, weakness (ambulates with cane) and light-headedness. Negative for syncope, numbness and headaches.   Hematological:  Bruises/bleeds easily (on aspirin).   Psychiatric/Behavioral: Negative.  Negative for sleep disturbance.        Objective   Vitals:    09/30/24 1319   BP: 126/79   Pulse: 104   SpO2: 94%   Weight: 63 kg (139 lb)   Height: 162.6 cm (64\")      /79   Pulse 104   Ht 162.6 cm (64\")   Wt 63 kg (139 lb)   SpO2 94%   BMI 23.86 kg/m²    Lab Results (most recent)       None          Physical Exam  Vitals and nursing note reviewed.   Constitutional:       General: She is not in acute distress.     Appearance: She is well-developed.   HENT:      Head: Normocephalic and atraumatic.   Eyes:      Conjunctiva/sclera: Conjunctivae normal.      Pupils: Pupils are equal, round, and reactive to light.   Neck:      Vascular: No JVD.      Trachea: No tracheal deviation.   Cardiovascular:      Rate and Rhythm: " Normal rate and regular rhythm.      Heart sounds: Normal heart sounds.      Comments: Soft systolic murmur noted mid sternal border.  Pulmonary:      Effort: Pulmonary effort is normal.      Breath sounds: Normal breath sounds.   Abdominal:      General: Bowel sounds are normal. There is no distension.      Palpations: Abdomen is soft. There is no mass.      Tenderness: There is no abdominal tenderness. There is no guarding or rebound.   Musculoskeletal:         General: No tenderness or deformity. Normal range of motion.      Cervical back: Normal range of motion and neck supple.      Right lower le+ Edema present.      Left lower le+ Edema present.   Skin:     General: Skin is warm and dry.      Coloration: Skin is not pale.      Findings: No erythema or rash.   Neurological:      Mental Status: She is alert and oriented to person, place, and time.   Psychiatric:         Behavior: Behavior normal.         Thought Content: Thought content normal.         Judgment: Judgment normal.           Procedure     ECG 12 Lead    Date/Time: 2024 1:27 PM  Performed by: Chidi Perdue PA    Authorized by: Chidi Perdue PA  Comparison: compared with previous ECG from 2023  Comparison to previous ECG: Sinus tachycardia, right bundle branch block morphology, left axis deviation consistent with left anterior fascicular block, no acute changes noted.             Assessment & Plan      Diagnosis Plan   1. Dyspnea on exertion        2. Pulmonary HTN        3. Primary hypertension            1.  Clinically, the patient appears stable.  She again confirms she was no consideration for further testing.  She wants medications only.  Should she change her mind she will call the clinic.    2.  She appears to be on appropriate medical regimen.  I would make no adjustments.    3.  For change in clinical course she will call immediately.  We will continue to see her on a yearly evaluation at her request.          Advance Care Planning   ACP discussion was held with the patient during this visit. Patient has an advance directive (not in EMR), copy requested.           Electronically signed by:

## 2024-10-17 ENCOUNTER — OFFICE VISIT (OUTPATIENT)
Dept: PULMONOLOGY | Facility: CLINIC | Age: 89
End: 2024-10-17
Payer: MEDICARE

## 2024-10-17 VITALS
OXYGEN SATURATION: 97 % | DIASTOLIC BLOOD PRESSURE: 76 MMHG | BODY MASS INDEX: 23.39 KG/M2 | HEART RATE: 92 BPM | SYSTOLIC BLOOD PRESSURE: 142 MMHG | HEIGHT: 64 IN | WEIGHT: 137 LBS

## 2024-10-17 DIAGNOSIS — J47.9 BRONCHIECTASIS WITHOUT COMPLICATION: ICD-10-CM

## 2024-10-17 DIAGNOSIS — J44.9 CHRONIC OBSTRUCTIVE PULMONARY DISEASE, UNSPECIFIED COPD TYPE: Primary | ICD-10-CM

## 2024-10-17 RX ORDER — ALBUTEROL SULFATE 90 UG/1
2 INHALANT RESPIRATORY (INHALATION) EVERY 4 HOURS PRN
Qty: 8.5 G | Refills: 2 | Status: SHIPPED | OUTPATIENT
Start: 2024-10-17

## 2024-10-17 RX ORDER — BUDESONIDE, GLYCOPYRROLATE, AND FORMOTEROL FUMARATE 160; 9; 4.8 UG/1; UG/1; UG/1
2 AEROSOL, METERED RESPIRATORY (INHALATION) 2 TIMES DAILY
Qty: 3 EACH | Refills: 3 | Status: SHIPPED | OUTPATIENT
Start: 2024-10-17

## 2024-10-17 NOTE — PROGRESS NOTES
Follow Up Office Visit      Patient Name: NOEL Rice    Chief Complaint:    Chief Complaint   Patient presents with    Shortness of Breath       History of Present Illness: NOEL Rice is a 89 y.o. female who is here today for follow up of COPD.  Since last visit, she notes that she has had stable symptoms.  She reports compliance with Breztri.  She requires short acting beta agonist use approximately 3 times per month.  She denies having any exacerbations.  Occasional dry cough.  No wheezing.  No fevers, no hemoptysis, no unintended weight loss.    Supplemental Oxygen: No    Subjective      Review of Systems:  Review of Systems   Constitutional:  Negative for fever and unexpected weight change.   Respiratory:  Positive for cough and shortness of breath. Negative for wheezing.    Cardiovascular:  Negative for chest pain and leg swelling.        Past Medical History:   Past Medical History:   Diagnosis Date    Anxiety     Diabetes mellitus     Hypertension     Vitamin D deficiency        Past Surgical History:   Past Surgical History:   Procedure Laterality Date    CHOLECYSTECTOMY      HYSTERECTOMY         Family History:   Family History   Problem Relation Age of Onset    Diabetes Mother     Heart attack Father     Heart attack Sister     Lung cancer Sister        Social History:   Social History     Socioeconomic History    Marital status:    Tobacco Use    Smoking status: Former     Current packs/day: 1.00     Average packs/day: 1 pack/day for 20.8 years (20.8 ttl pk-yrs)     Types: Cigarettes     Start date: 2004     Passive exposure: Past    Smokeless tobacco: Never    Tobacco comments:     Its been quite awhile since she quit smoking    Vaping Use    Vaping status: Never Used   Substance and Sexual Activity    Alcohol use: Not Currently    Drug use: Never    Sexual activity: Defer       Current Medications:     Current Outpatient Medications:     albuterol sulfate  (90 Base) MCG/ACT  "inhaler, Inhale 2 puffs Every 4 (Four) Hours As Needed for Wheezing or Shortness of Air., Disp: 8.5 g, Rfl: 1    amLODIPine (NORVASC) 5 MG tablet, Take 1 tablet by mouth Daily., Disp: 30 tablet, Rfl: 11    aspirin 81 MG EC tablet, Take 1 tablet by mouth Daily., Disp: , Rfl:     Budeson-Glycopyrrol-Formoterol (Breztri Aerosphere) 160-9-4.8 MCG/ACT aerosol inhaler, Inhale 2 puffs 2 (Two) Times a Day. Rinse mouth out after use, Disp: 3 each, Rfl: 3    citalopram (CeleXA) 20 MG tablet, Take 1 tablet by mouth Daily., Disp: , Rfl:     cloNIDine (Catapres) 0.1 MG tablet, Take 1 tablet by mouth 2 (Two) Times a Day. (Patient taking differently: Take 1 tablet by mouth Daily.), Disp: 60 tablet, Rfl: 11    FeroSul 325 (65 Fe) MG tablet, Take 1 tablet by mouth Daily. with food., Disp: , Rfl:     LUTEIN PO, Take  by mouth., Disp: , Rfl:     metFORMIN (GLUCOPHAGE) 1000 MG tablet, Take 1 tablet by mouth 2 (Two) Times a Day With Meals., Disp: , Rfl:     valsartan (DIOVAN) 320 MG tablet, Take 1 tablet by mouth Daily., Disp: 30 tablet, Rfl: 11    vitamin B-12 (CYANOCOBALAMIN) 1000 MCG tablet, Take 1 tablet by mouth Daily., Disp: , Rfl:     vitamin D (ERGOCALCIFEROL) 1.25 MG (40803 UT) capsule capsule, Take 1 capsule by mouth 1 (One) Time Per Week., Disp: , Rfl:      Allergies:   No Known Allergies    Objective     Physical Exam:  Vital Signs:   Vitals:    10/17/24 1312   BP: 142/76   Pulse: 92   SpO2: 97%   Weight: 62.1 kg (137 lb)   Height: 162.6 cm (64\")     Body mass index is 23.52 kg/m².    Physical Exam  Vitals reviewed.   Constitutional:       General: She is not in acute distress.     Appearance: She is not toxic-appearing.   HENT:      Head: Normocephalic and atraumatic.      Mouth/Throat:      Mouth: Mucous membranes are moist.   Eyes:      Extraocular Movements: Extraocular movements intact.      Conjunctiva/sclera: Conjunctivae normal.   Cardiovascular:      Rate and Rhythm: Normal rate.      Heart sounds: Normal heart " sounds.   Pulmonary:      Effort: Pulmonary effort is normal.      Breath sounds: Normal breath sounds.   Abdominal:      General: There is no distension.      Palpations: Abdomen is soft.   Musculoskeletal:      Cervical back: Neck supple.      Comments: Ambulates with a cane   Skin:     General: Skin is warm and dry.      Findings: No rash.   Neurological:      General: No focal deficit present.      Mental Status: She is alert and oriented to person, place, and time.   Psychiatric:         Mood and Affect: Mood normal.         Behavior: Behavior normal.       Assessment / Plan      Assessment/Plan:   Diagnoses and all orders for this visit:    1. Chronic obstructive pulmonary disease, unspecified COPD type (Primary)  -     Budeson-Glycopyrrol-Formoterol (Breztri Aerosphere) 160-9-4.8 MCG/ACT aerosol inhaler; Inhale 2 puffs 2 (Two) Times a Day. Rinse mouth out after use  Dispense: 3 each; Refill: 3  -     albuterol sulfate  (90 Base) MCG/ACT inhaler; Inhale 2 puffs Every 4 (Four) Hours As Needed for Wheezing or Shortness of Air.  Dispense: 8.5 g; Refill: 2  Stable symptoms.  Continue current inhaled regimen. We discussed the risk and benefits of inhaled corticosteroids. Patient instructed to take them on a regular basis as prescribed. Patient instructed to rinse their mouth out after each use.     2. Bronchiectasis without complication  No exacerbation symptoms.       Follow Up:   Return in about 1 year (around 10/17/2025) for Recheck.  The patient was counseled on diagnostic results, risks and benefits of treatment options, risk factor modifications and the importance of treatment compliance. The patient was advised to contact the clinic with concerns or worsening symptoms.     TONJA Laura   [facility] [place]     This document has been electronically signed by TONJA Laura  October 17, 2024

## 2025-08-27 RX ORDER — VALSARTAN 320 MG/1
TABLET ORAL
Qty: 60 TABLET | Refills: 0 | Status: SHIPPED | OUTPATIENT
Start: 2025-08-27